# Patient Record
Sex: FEMALE | Race: BLACK OR AFRICAN AMERICAN | Employment: FULL TIME | URBAN - METROPOLITAN AREA
[De-identification: names, ages, dates, MRNs, and addresses within clinical notes are randomized per-mention and may not be internally consistent; named-entity substitution may affect disease eponyms.]

---

## 2022-08-26 ENCOUNTER — APPOINTMENT (OUTPATIENT)
Dept: CT IMAGING | Age: 57
DRG: 045 | End: 2022-08-26
Attending: STUDENT IN AN ORGANIZED HEALTH CARE EDUCATION/TRAINING PROGRAM
Payer: MEDICAID

## 2022-08-26 ENCOUNTER — APPOINTMENT (OUTPATIENT)
Dept: MRI IMAGING | Age: 57
DRG: 045 | End: 2022-08-26
Attending: INTERNAL MEDICINE
Payer: MEDICAID

## 2022-08-26 ENCOUNTER — HOSPITAL ENCOUNTER (INPATIENT)
Age: 57
LOS: 13 days | Discharge: NURSING, REHAB OR LONG-TERM CARE | DRG: 045 | End: 2022-09-08
Attending: STUDENT IN AN ORGANIZED HEALTH CARE EDUCATION/TRAINING PROGRAM | Admitting: INTERNAL MEDICINE
Payer: MEDICAID

## 2022-08-26 DIAGNOSIS — I63.9 CEREBROVASCULAR ACCIDENT (CVA), UNSPECIFIED MECHANISM (HCC): Primary | ICD-10-CM

## 2022-08-26 LAB
ALBUMIN SERPL-MCNC: 4.2 G/DL (ref 3.5–5)
ALBUMIN/GLOB SERPL: 1.1 {RATIO} (ref 1.1–2.2)
ALP SERPL-CCNC: 68 U/L (ref 45–117)
ALT SERPL-CCNC: 20 U/L (ref 12–78)
ANION GAP SERPL CALC-SCNC: 8 MMOL/L (ref 5–15)
APTT PPP: 27.1 SEC (ref 21.2–34.1)
AST SERPL W P-5'-P-CCNC: 20 U/L (ref 15–37)
ATRIAL RATE: 65 BPM
BASOPHILS # BLD: 0 K/UL (ref 0–0.1)
BASOPHILS NFR BLD: 1 % (ref 0–1)
BILIRUB SERPL-MCNC: 0.6 MG/DL (ref 0.2–1)
BUN SERPL-MCNC: 5 MG/DL (ref 6–20)
BUN/CREAT SERPL: 7 (ref 12–20)
CA-I BLD-MCNC: 10 MG/DL (ref 8.5–10.1)
CALCULATED P AXIS, ECG09: 45 DEGREES
CALCULATED R AXIS, ECG10: -28 DEGREES
CALCULATED T AXIS, ECG11: 3 DEGREES
CHLORIDE SERPL-SCNC: 107 MMOL/L (ref 97–108)
CO2 SERPL-SCNC: 25 MMOL/L (ref 21–32)
CREAT SERPL-MCNC: 0.7 MG/DL (ref 0.55–1.02)
DIAGNOSIS, 93000: NORMAL
DIFFERENTIAL METHOD BLD: ABNORMAL
EOSINOPHIL # BLD: 0.2 K/UL (ref 0–0.4)
EOSINOPHIL NFR BLD: 2 % (ref 0–7)
ERYTHROCYTE [DISTWIDTH] IN BLOOD BY AUTOMATED COUNT: 15.3 % (ref 11.5–14.5)
GLOBULIN SER CALC-MCNC: 3.7 G/DL (ref 2–4)
GLUCOSE BLD STRIP.AUTO-MCNC: 136 MG/DL (ref 65–117)
GLUCOSE SERPL-MCNC: 124 MG/DL (ref 65–100)
HCT VFR BLD AUTO: 38.2 % (ref 35–47)
HGB BLD-MCNC: 12.2 G/DL (ref 11.5–16)
IMM GRANULOCYTES # BLD AUTO: 0 K/UL (ref 0–0.04)
IMM GRANULOCYTES NFR BLD AUTO: 0 % (ref 0–0.5)
INR PPP: 1 (ref 0.9–1.1)
LYMPHOCYTES # BLD: 2.7 K/UL (ref 0.8–3.5)
LYMPHOCYTES NFR BLD: 39 % (ref 12–49)
MCH RBC QN AUTO: 28.2 PG (ref 26–34)
MCHC RBC AUTO-ENTMCNC: 31.9 G/DL (ref 30–36.5)
MCV RBC AUTO: 88.2 FL (ref 80–99)
MONOCYTES # BLD: 0.5 K/UL (ref 0–1)
MONOCYTES NFR BLD: 7 % (ref 5–13)
NEUTS SEG # BLD: 3.4 K/UL (ref 1.8–8)
NEUTS SEG NFR BLD: 51 % (ref 32–75)
NRBC # BLD: 0 K/UL (ref 0–0.01)
NRBC BLD-RTO: 0 PER 100 WBC
P-R INTERVAL, ECG05: 184 MS
PERFORMED BY, TECHID: ABNORMAL
PLATELET # BLD AUTO: 312 K/UL (ref 150–400)
PMV BLD AUTO: 8.8 FL (ref 8.9–12.9)
POTASSIUM SERPL-SCNC: 3.6 MMOL/L (ref 3.5–5.1)
PROT SERPL-MCNC: 7.9 G/DL (ref 6.4–8.2)
PROTHROMBIN TIME: 13.1 SEC (ref 11.9–14.6)
Q-T INTERVAL, ECG07: 450 MS
QRS DURATION, ECG06: 92 MS
QTC CALCULATION (BEZET), ECG08: 468 MS
RBC # BLD AUTO: 4.33 M/UL (ref 3.8–5.2)
SODIUM SERPL-SCNC: 140 MMOL/L (ref 136–145)
THERAPEUTIC RANGE,PTTT: NORMAL SEC (ref 82–109)
TROPONIN-HIGH SENSITIVITY: 10 NG/L (ref 0–51)
VENTRICULAR RATE, ECG03: 65 BPM
WBC # BLD AUTO: 6.8 K/UL (ref 3.6–11)

## 2022-08-26 PROCEDURE — 84484 ASSAY OF TROPONIN QUANT: CPT

## 2022-08-26 PROCEDURE — 85730 THROMBOPLASTIN TIME PARTIAL: CPT

## 2022-08-26 PROCEDURE — 80053 COMPREHEN METABOLIC PANEL: CPT

## 2022-08-26 PROCEDURE — 85025 COMPLETE CBC W/AUTO DIFF WBC: CPT

## 2022-08-26 PROCEDURE — 74011250637 HC RX REV CODE- 250/637: Performed by: INTERNAL MEDICINE

## 2022-08-26 PROCEDURE — 82962 GLUCOSE BLOOD TEST: CPT

## 2022-08-26 PROCEDURE — 70450 CT HEAD/BRAIN W/O DYE: CPT

## 2022-08-26 PROCEDURE — 70551 MRI BRAIN STEM W/O DYE: CPT

## 2022-08-26 PROCEDURE — 99285 EMERGENCY DEPT VISIT HI MDM: CPT

## 2022-08-26 PROCEDURE — 74011250636 HC RX REV CODE- 250/636: Performed by: INTERNAL MEDICINE

## 2022-08-26 PROCEDURE — 65270000029 HC RM PRIVATE

## 2022-08-26 PROCEDURE — 70496 CT ANGIOGRAPHY HEAD: CPT

## 2022-08-26 PROCEDURE — 85610 PROTHROMBIN TIME: CPT

## 2022-08-26 PROCEDURE — 93005 ELECTROCARDIOGRAM TRACING: CPT

## 2022-08-26 PROCEDURE — 36415 COLL VENOUS BLD VENIPUNCTURE: CPT

## 2022-08-26 PROCEDURE — 74011000636 HC RX REV CODE- 636: Performed by: STUDENT IN AN ORGANIZED HEALTH CARE EDUCATION/TRAINING PROGRAM

## 2022-08-26 PROCEDURE — 92610 EVALUATE SWALLOWING FUNCTION: CPT

## 2022-08-26 RX ORDER — AMLODIPINE BESYLATE 5 MG/1
10 TABLET ORAL DAILY
Status: DISCONTINUED | OUTPATIENT
Start: 2022-08-26 | End: 2022-09-08 | Stop reason: HOSPADM

## 2022-08-26 RX ORDER — IBUPROFEN 200 MG
1 TABLET ORAL DAILY
Status: DISCONTINUED | OUTPATIENT
Start: 2022-08-26 | End: 2022-09-08 | Stop reason: HOSPADM

## 2022-08-26 RX ORDER — ENOXAPARIN SODIUM 100 MG/ML
40 INJECTION SUBCUTANEOUS EVERY 24 HOURS
Status: DISCONTINUED | OUTPATIENT
Start: 2022-08-26 | End: 2022-09-08 | Stop reason: HOSPADM

## 2022-08-26 RX ORDER — HYDRALAZINE HYDROCHLORIDE 20 MG/ML
10 INJECTION INTRAMUSCULAR; INTRAVENOUS
Status: DISCONTINUED | OUTPATIENT
Start: 2022-08-26 | End: 2022-09-08 | Stop reason: HOSPADM

## 2022-08-26 RX ORDER — GUAIFENESIN 100 MG/5ML
81 LIQUID (ML) ORAL DAILY
Status: DISCONTINUED | OUTPATIENT
Start: 2022-08-26 | End: 2022-09-08 | Stop reason: HOSPADM

## 2022-08-26 RX ORDER — LISINOPRIL 10 MG/1
10 TABLET ORAL DAILY
Status: DISCONTINUED | OUTPATIENT
Start: 2022-08-26 | End: 2022-08-27

## 2022-08-26 RX ORDER — ACETAMINOPHEN 650 MG/1
650 SUPPOSITORY RECTAL
Status: DISCONTINUED | OUTPATIENT
Start: 2022-08-26 | End: 2022-09-08 | Stop reason: HOSPADM

## 2022-08-26 RX ORDER — ACETAMINOPHEN 325 MG/1
650 TABLET ORAL
Status: DISCONTINUED | OUTPATIENT
Start: 2022-08-26 | End: 2022-09-08 | Stop reason: HOSPADM

## 2022-08-26 RX ADMIN — IOPAMIDOL 100 ML: 755 INJECTION, SOLUTION INTRAVENOUS at 07:50

## 2022-08-26 RX ADMIN — ENOXAPARIN SODIUM 40 MG: 100 INJECTION SUBCUTANEOUS at 10:03

## 2022-08-26 RX ADMIN — LISINOPRIL 10 MG: 10 TABLET ORAL at 10:02

## 2022-08-26 RX ADMIN — HYDRALAZINE HYDROCHLORIDE 10 MG: 20 INJECTION, SOLUTION INTRAMUSCULAR; INTRAVENOUS at 10:03

## 2022-08-26 RX ADMIN — AMLODIPINE BESYLATE 10 MG: 5 TABLET ORAL at 10:02

## 2022-08-26 RX ADMIN — ASPIRIN 81 MG CHEWABLE TABLET 81 MG: 81 TABLET CHEWABLE at 10:02

## 2022-08-26 NOTE — PROGRESS NOTES
Problem: Falls - Risk of  Goal: *Absence of Falls  Description: Document [place] Flow Fall Risk and appropriate interventions in the flowsheet.   Outcome: Not Met  Note: Fall Risk Interventions:  Mobility Interventions: Bed/chair exit alarm, PT Consult for mobility concerns, Patient to call before getting OOB         Medication Interventions: Bed/chair exit alarm, Patient to call before getting OOB    Elimination Interventions: Bed/chair exit alarm, Call light in reach              Problem: Patient Education: Go to Patient Education Activity  Goal: Patient/Family Education  Outcome: Not Met

## 2022-08-26 NOTE — ED TRIAGE NOTES
Right sided weakness since 1300 yesterday afternoon along with a right sided facial droop patient states she felt like she couldn't walk right  Level 2 stroke alert called in triage

## 2022-08-26 NOTE — ED PROVIDER NOTES
[facility]  EMERGENCY DEPARTMENT ENCOUNTER NOTE    Date: 8/26/2022  Patient Name: Alyssa Carbone    History of Presenting Illness     Chief Complaint   Patient presents with    Extremity Weakness     HPI: Alyssa Carbone, 59 y.o. female with a past medical history and outpatient medications as listed and reviewed below  presents for weakness in the right upper and lower extremities as well as right-sided facial droop. He started having symptoms yesterday in the afternoon. She denies any headache or numbness however she does report weakness in the above-mentioned areas. No trauma. No history of similar. No AC use. she reports that she does not have any history of medical problems and has seen his doctor last 2 years ago. She does have elevated blood pressures after triage up to 897 systolic. Medical History   I reviewed the medical, surgical, family, and social history, as well as allergies:    PCP: None    Past Medical History:  History reviewed. No pertinent past medical history. Past Surgical History:  History reviewed. No pertinent surgical history. Current Outpatient Medications:  No current outpatient medications   Family History:  History reviewed. No pertinent family history. Social History:  Social History     Tobacco Use    Smoking status: Never    Smokeless tobacco: Never   Substance Use Topics    Alcohol use: Not Currently    Drug use: Never     Allergies:  No Known Allergies    Review of Systems     Review of Systems  Negative: Positives and pertinent negatives as per HPI. All other systems were reviewed and are negative. Physical Exam and Vital Signs   Vital Signs - Reviewed the patient's vital signs.     Patient Vitals for the past 12 hrs:   Temp Pulse Resp BP SpO2   08/26/22 0726 -- 84 23 (!) 194/103 99 %   08/26/22 0715 -- 67 18 (!) 197/87 98 %   08/26/22 0646 98.4 °F (36.9 °C) 84 16 139/84 99 %     Physical Exam:    GENERAL: awake, alert, cooperative, not in distress  HEENT:  * Pupils equal, EOMI  * Head atraumatic  CV:  * audible heart sounds  * warm and perfused extremities bilaterally  PULMONARY: Good air movement, no wheezes, no crackles  ABDOMEN/: soft, no distension, no guarding, no abdominal tenderness  EXTREMITIES/BACK: warm and perfused, no tenderness, no edema  SKIN: no rashes or signs of trauma  NEURO:   * GCS = 15 (E=4, V=5, M=6)  * Awake, alert, oriented x 3, normal speech  * R lower facial droop  * Strength 5/5 in LUE and LLE, 4/5 in RUE and RLE with pronator drift  * Sensory exam grossly normal  * No pronator drift or dysmetria  * NIHSS 5      Medical Decision Making   - I am the first and primary provider for this patient and am the primary provider of record. - I reviewed the vital signs, available nursing notes, past medical history, past surgical history, family history and social history. - Initial assessment performed. The patients presenting problems have been discussed, and the staff are in agreement with the care plan formulated and outlined with them. I have encouraged them to ask questions as they arise throughout their visit. - Available medical records, nursing notes, old EKGs, and EMS run sheets (if patient was EMS transported) were reviewed    MDM:   Patient is a 59 y.o. female presenting for stroke like symptoms with last known normal yesterday 2pm. Vitals reveal  HTN  and physical exam reveals NIHSS 5 due to R face,RUE,RLE weakness and mild dysarthria. . EKG showed no significant abnormalities. Based on the history, physical exam, risk factors, and vitals signs, I favor the following differential diagnoses: ischemic stroke, hemorrhagic stroke, ICH, seizure, TIA, vertebrobasilar insufficiency, carotid stenosis, metabolic encephalopathy. Patient is outside the window for TPA administration. Will initiate workup and treatment. See ED Course and Reassessment for evaluation and discussion.     Results Labs:  Recent Results (from the past 12 hour(s))   GLUCOSE, POC    Collection Time: 08/26/22  6:46 AM   Result Value Ref Range    Glucose (POC) 136 (H) 65 - 117 mg/dL    Performed by Isai Bonilla    CBC WITH AUTOMATED DIFF    Collection Time: 08/26/22  7:02 AM   Result Value Ref Range    WBC 6.8 3.6 - 11.0 K/uL    RBC 4.33 3.80 - 5.20 M/uL    HGB 12.2 11.5 - 16.0 g/dL    HCT 38.2 35.0 - 47.0 %    MCV 88.2 80.0 - 99.0 FL    MCH 28.2 26.0 - 34.0 PG    MCHC 31.9 30.0 - 36.5 g/dL    RDW 15.3 (H) 11.5 - 14.5 %    PLATELET 980 298 - 142 K/uL    MPV 8.8 (L) 8.9 - 12.9 FL    NRBC 0.0 0.0  WBC    ABSOLUTE NRBC 0.00 0.00 - 0.01 K/uL    NEUTROPHILS 51 32 - 75 %    LYMPHOCYTES 39 12 - 49 %    MONOCYTES 7 5 - 13 %    EOSINOPHILS 2 0 - 7 %    BASOPHILS 1 0 - 1 %    IMMATURE GRANULOCYTES 0 0 - 0.5 %    ABS. NEUTROPHILS 3.4 1.8 - 8.0 K/UL    ABS. LYMPHOCYTES 2.7 0.8 - 3.5 K/UL    ABS. MONOCYTES 0.5 0.0 - 1.0 K/UL    ABS. EOSINOPHILS 0.2 0.0 - 0.4 K/UL    ABS. BASOPHILS 0.0 0.0 - 0.1 K/UL    ABS. IMM. GRANS. 0.0 0.00 - 0.04 K/UL    DF AUTOMATED     METABOLIC PANEL, COMPREHENSIVE    Collection Time: 08/26/22  7:02 AM   Result Value Ref Range    Sodium 140 136 - 145 mmol/L    Potassium 3.6 3.5 - 5.1 mmol/L    Chloride 107 97 - 108 mmol/L    CO2 25 21 - 32 mmol/L    Anion gap 8 5 - 15 mmol/L    Glucose 124 (H) 65 - 100 mg/dL    BUN 5 (L) 6 - 20 mg/dL    Creatinine 0.70 0.55 - 1.02 mg/dL    BUN/Creatinine ratio 7 (L) 12 - 20      GFR est AA >60 >60 ml/min/1.73m2    GFR est non-AA >60 >60 ml/min/1.73m2    Calcium 10.0 8.5 - 10.1 mg/dL    Bilirubin, total 0.6 0.2 - 1.0 mg/dL    AST (SGOT) 20 15 - 37 U/L    ALT (SGPT) 20 12 - 78 U/L    Alk.  phosphatase 68 45 - 117 U/L    Protein, total 7.9 6.4 - 8.2 g/dL    Albumin 4.2 3.5 - 5.0 g/dL    Globulin 3.7 2.0 - 4.0 g/dL    A-G Ratio 1.1 1.1 - 2.2     PROTHROMBIN TIME + INR    Collection Time: 08/26/22  7:02 AM   Result Value Ref Range    Prothrombin time 13.1 11.9 - 14.6 sec INR 1.0 0.9 - 1.1     PTT    Collection Time: 08/26/22  7:02 AM   Result Value Ref Range    aPTT 27.1 21.2 - 34.1 sec    aPTT, therapeutic range   82 - 109 sec   TROPONIN-HIGH SENSITIVITY    Collection Time: 08/26/22  7:02 AM   Result Value Ref Range    Troponin-High Sensitivity 10 0 - 51 ng/L     Radiologic Studies:  CT Results  (Last 48 hours)                 08/26/22 0655  CT CODE NEURO HEAD WO CONTRAST Final result    Impression:  No acute intracranial process. Narrative:  INDICATION: Weakness and facial droop       TECHNIQUE: 5 mm axial images were obtained from the skull base through the   vertex. CT dose reduction was achieved through use of a standardized protocol   tailored for this examination and automatic exposure control for dose   modulation. FINDINGS: The ventricles and cortical sulci are appropriate in size and   configuration. There is no evidence of intracranial hemorrhage, mass, mass   effect, or acute infarct. No extra-axial fluid collections are seen. The   visualized paranasal sinuses and mastoid air cells are clear. The orbital   structures are unremarkable. No osseous abnormalities are seen. CXR Results  (Last 48 hours)      None          Medications ordered:  Medications   iopamidoL (ISOVUE-370) 76 % injection 100 mL (100 mL IntraVENous Given 8/26/22 0750)     ED Course and Reassessment     ED Course:     ED Course as of 09/02/22 1120   Fri Aug 26, 2022   0812 CBC does not show any evidence of acute process. Leukocytosis not present to suggest infection. Hemoglobin not suggestive of acute anemia. No significant electrolyte derangements. Creatinine is not elevated more than baseline range making DIVINA unlikely. No significant transaminitis noted. Normal bilirubin. Trop 10 will trend. [SS]   0813 Head CT was negative for acute process making acute intracranial bleed unlikely. No evidence of large subacute stroke, ventriculomegaly, or masses.    [SS] 7170 Will admit for workup of likely basal ganglia stroke. [SS]      ED Course User Index  [SS] Martha Luciano MD       Reassessment:    Will admit. Final Disposition     Admission: [facility]    After completion of ED workup and discussion of results and diagnoses, patient was admitted to the hospital. Case was discussed with admitting provider. ED Procedures   Performed by: Cyndy Ordonez MD  Procedures     EKG interpretation (Preliminary):  Rhythm: normal sinus rhythm; and regular . Rate (approx.): 65.  Axis: normal;  IL interval: normal;  QRS interval: normal ;  ST/T wave: abnormal: Nonspecific T-Wave inversion present in: III  Other findings: abnormal EKG: Nonspecific T wave inversion    CRITICAL CARE NOTE :  11:20 AM    Critical care time is being documented due to the fulfillment of at least one of the following:    - Critical conditions: condition that acutely impairs one or more vital organ systems such that there is a high probability of imminent or life-threatening deterioration in condition. Examples are diagnoses including but not limited to Afib RVR, DKA, PE, Etc. .    - Critical interventions: an action whose failure to initiate would likely allow a sudden, clinically significant decline in the patient's condition. These include  Requirement of transfer or ICU admission  Contemplation or provision of tPA  Drip initiation (pressors, antiarrhythmics, heparin, etc.)  Antidotes given (narcan, charcoal, epi for anaphylaxis, etc..)  >=2L fluid bolus  >=3 Duonebs  >1 IV/IM doses of sedatives, antiepileptics, BP meds, rate control meds, adenosine. Procedures that are suggestive of critical care: chest tubes, cardioversion, BiPAP, IO, etc..    Critical care time is documented based on continuous or non-continuous provision of care that includes face-to-face time, placing orders, chart review, documentation, discussion with consultants, discussion with family.  This time calculation is a best approximation and does not include time spent on CPR, EKG interpretation, central line placement, intubation, laceration repairs, and other separately billed procedures. Amount of Critical Care Time: 35minutes    Details of critical care provision is documented above. A general summary is listed below:    IMPENDING DETERIORATION - CNS  ASSOCIATED RISK FACTORS - CNS Decompensation  MANAGEMENT- Bedside Assessment  INTERPRETATION -  CT Scan, ECG, and Blood Pressure  INTERVENTIONS - Neurologic/Metabolic interventions  CASE REVIEW - Hospitalist  TREATMENT RESPONSE - Stable  PERFORMED BY - Self    NOTES   :  During this entire length of time I was immediately available to the patient . Nathaly Tiwari MD     Diagnosis     Clinical Impression:   1. Cerebrovascular accident (CVA), unspecified mechanism ([facility] [address]      Attestations:    Nathaly Tiwari MD    Please note that this dictation was completed with [employer], the computer voice recognition software. Quite often unanticipated grammatical, syntax, homophones, and other interpretive errors are inadvertently transcribed by the computer software. Please disregard these errors. Please excuse any errors that have escaped final proofreading. Thank you.

## 2022-08-26 NOTE — PROGRESS NOTES
Reason for Admission:  CVA                     RUR Score:      N/A               Plan for utilizing home health:  Not at this time        PCP: First and Last name:  None     Name of Practice:    Are you a current patient: Yes/No:    Approximate date of last visit:    Can you participate in a virtual visit with your PCP:                     Current Advanced Directive/Advance Care Plan: No Order      Healthcare Decision Maker:   Click here to complete [address] including selection of the Healthcare Decision Maker Relationship (ie \"Primary\")    Kae Maldonado, mother, [phone]                         Transition of Care Plan:          Met f/f with Pt, she confirmed that the information on the face sheet is correct. Pt stated that she lives with her daughter. Pt stated that no HH, no DME and independent with ADL. Pt stated that she uses [facility] in [place]. Pt stated that family will give her a ride when she is D/C. CM dispo: TBD, CM will follow for D/C needs.

## 2022-08-26 NOTE — PROGRESS NOTES
SPEECH LANGUAGE PATHOLOGY BEDSIDE SWALLOW EVALUATIONS  Patient: Ignacio Arzola  (69 y.o. )  Date: 8/26/2022  Primary Diagnosis: CVA   Precautions:  Fall    ASSESSMENT :  Patient is A&Ox4 and follows basic commands. R facial droop w/ R lingual deviation and mild dysarthria. Speech is 95% intelligible. Informally, language appears wfl. Oropharyngeal swallow is wfl. Oral phase c/b timely mastication and effective bolus formatio and manipulation. Pharyngeal phase c/b timely swallow and HLE is wfl upon palpation. NO overt s/s of pen/asp observed. PLAN :  Recommendations and Planned Interventions:  Rec cont regular/thin diet w/ strict asp/GERD precautions. No follow up for sw fxn. Rec speech evaluation. SUBJECTIVE:   Patient reports denies dysphagia reports speech is intermittently slurred. OBJECTIVE:   Patient admitted w/ R sided weakness and R facial droop. PMH for HTN an tobacco use. Diet prior to admission: Regular  Current Diet:  DIET ADULT     Cognitive and Communication Status:  Neurologic State: Alert  Orientation Level: Oriented X4  Cognition: Follows commands, Appropriate decision making  Perception: Appears intact  Perseveration: No perseveration noted  Safety/Judgement: Awareness of environment  Swallowing Evaluation:   Oral Assessment:  Oral Assessment  Labial: Right droop  Dentition: Intact  Oral Hygiene: wfl  Lingual: Right deviation  Velum: No impairment  Mandible: No impairment  P.O. Trials:  Patient Position: upright in stretcher  Vocal quality prior to P.O.: No impairment  Consistency Presented: Thin liquid; Solid;Puree  How Presented: Self-fed/presented;Straw;Successive swallows     Bolus Acceptance: No impairment  Bolus Formation/Control: No impairment     Propulsion: No impairment  Oral Residue: None  Initiation of Swallow: No impairment  Laryngeal Elevation: Functional  Aspiration Signs/Symptoms: None  Pharyngeal Phase Characteristics: No impairment, issues, or problems Oral Phase Severity: No impairment  Pharyngeal Phase Severity : No impairment  Voice:  Vocal Quality: No impairment          Pain:   0    After treatment:   Patient left in no apparent distress in bed, Call bell within reach, Nursing notified, and Caregiver / family present    COMMUNICATION/EDUCATION:   Patient was educated regarding BEFAST, s/s aspiration, aspiration precautions, diet recs, speech strategies and ST POC. She demonstrated Good understanding as evidenced by engagement. The patient's plan of care including recommendations, planned interventions, and recommended diet changes were discussed with: Registered nurse.          Thank you for this referral.  Bernarda Zarate M.S., M.Ed., CCC-SLP  Time Calculation: 10 mins

## 2022-08-26 NOTE — ROUTINE PROCESS
TRANSFER - OUT REPORT:    Verbal report given to lisandro on Bonita Marin  being transferred to  for routine progression of care       Report consisted of patients Situation, Background, Assessment and   Recommendations(SBAR). Information from the following report(s) SBAR was reviewed with the receiving nurse. Lines:   Peripheral IV 08/26/22 Right Antecubital (Active)   Site Assessment Clean, dry, & intact 08/26/22 0704   Phlebitis Assessment 0 08/26/22 0704   Infiltration Assessment 0 08/26/22 0704   Dressing Status Clean, dry, & intact 08/26/22 0704   Dressing Type Transparent 08/26/22 0704   Hub Color/Line Status Pink;Flushed 08/26/22 0704   Action Taken Blood drawn 08/26/22 4836        Opportunity for questions and clarification was provided.       Patient transported with:   [employer]

## 2022-08-26 NOTE — H&P
History & Physical    Primary Care Provider: None  Source of Information: Patient self    History of Presenting Illness:   Claudio Ovalle is a 59 y.o. female who presents with reports of right-sided extremity weakness with onset about 2:35 PM yesterday. She was at [facility] and developed right-sided weakness with inability to  stuff in the right hand. Went home and slept it off. Woke up early this morning and had significant difficulties getting out of bed therefore decided to come to the hospital.  Denies headaches, visual problems or trouble swallowing. No prior history of hypertension or diabetes. Evaluation in the ED revealed a systolic blood pressure of greater than 535 with a diastolic of over 611. She was completely out of tPA window. CT brain as well as CTA head and neck unremarkable. She does have a right-sided facial droop. Will be admitted for further neurologic work-up       Review of Systems:  A comprehensive review of systems was negative except for that written in the History of Present Illness. History reviewed. No pertinent past medical history. History reviewed. No pertinent surgical history. Prior to Admission medications    Not on File     No Known Allergies   History reviewed. No pertinent family history. SOCIAL HISTORY:  Patient resides:  Independently    Assisted Living    SNF    With family care x      Smoking history:   None    Former    Chronic x     Alcohol history:   None    Social x   Chronic      Ambulates:   Independently x   w/cane    w/walker    w/wc    CODE STATUS:  DNR    Full x   Other      Objective:     Physical Exam:     Visit Vitals  BP (!) 189/119 (BP 1 Location: Left arm, BP Patient Position: At rest)   Pulse 75   Temp 98.4 °F (36.9 °C)   Resp 23   Ht 5' 6\" (1.676 m)   Wt 73.5 kg (162 lb)   SpO2 100%   BMI 26.15 kg/m²      O2 Device: None (Room air)    General:  Alert, cooperative, no distress, appears stated age. Head:  Normocephalic, without obvious abnormality, atraumatic. Eyes:  Conjunctivae/corneas clear. PERRL, EOMs intact. Nose: Nares normal. Septum midline. Mucosa normal. No drainage or sinus tenderness. Throat: Lips, mucosa, and tongue normal. Teeth and gums normal.   Neck: Supple, symmetrical, trachea midline, no adenopathy, thyroid: no enlargement/tenderness/nodules, no carotid bruit and no JVD. Back:   Symmetric, no curvature. ROM normal. No CVA tenderness. Lungs:   Clear to auscultation bilaterally. Chest wall:  No tenderness or deformity. Heart:  Regular rate and rhythm, S1, S2 normal, no murmur, click, rub or gallop. Abdomen:   Soft, non-tender. Bowel sounds normal. No masses,  No organomegaly. Extremities: Extremities normal, atraumatic, no cyanosis or edema. Pulses: 2+ and symmetric all extremities. Skin: Skin color, texture, turgor normal. No rashes or lesions   Neurologic: CNII-XII intact. No motor or sensory deficits. EKG:  nonspecific ST and T waves changes. Data Review:     Recent Days:  Recent Labs     08/26/22  0702   WBC 6.8   HGB 12.2   HCT 38.2        Recent Labs     08/26/22  0702      K 3.6      CO2 25   *   BUN 5*   CREA 0.70   CA 10.0   ALB 4.2   TBILI 0.6   ALT 20   INR 1.0     No results for input(s): PH, PCO2, PO2, HCO3, FIO2 in the last 72 hours.     24 Hour Results:  Recent Results (from the past 24 hour(s))   GLUCOSE, POC    Collection Time: 08/26/22  6:46 AM   Result Value Ref Range    Glucose (POC) 136 (H) 65 - 117 mg/dL    Performed by Curtis Bryant    CBC WITH AUTOMATED DIFF    Collection Time: 08/26/22  7:02 AM   Result Value Ref Range    WBC 6.8 3.6 - 11.0 K/uL    RBC 4.33 3.80 - 5.20 M/uL    HGB 12.2 11.5 - 16.0 g/dL    HCT 38.2 35.0 - 47.0 %    MCV 88.2 80.0 - 99.0 FL    MCH 28.2 26.0 - 34.0 PG    MCHC 31.9 30.0 - 36.5 g/dL    RDW 15.3 (H) 11.5 - 14.5 %    PLATELET 842 451 - 992 K/uL    MPV 8.8 (L) 8.9 - 12.9 FL NRBC 0.0 0.0  WBC    ABSOLUTE NRBC 0.00 0.00 - 0.01 K/uL    NEUTROPHILS 51 32 - 75 %    LYMPHOCYTES 39 12 - 49 %    MONOCYTES 7 5 - 13 %    EOSINOPHILS 2 0 - 7 %    BASOPHILS 1 0 - 1 %    IMMATURE GRANULOCYTES 0 0 - 0.5 %    ABS. NEUTROPHILS 3.4 1.8 - 8.0 K/UL    ABS. LYMPHOCYTES 2.7 0.8 - 3.5 K/UL    ABS. MONOCYTES 0.5 0.0 - 1.0 K/UL    ABS. EOSINOPHILS 0.2 0.0 - 0.4 K/UL    ABS. BASOPHILS 0.0 0.0 - 0.1 K/UL    ABS. IMM. GRANS. 0.0 0.00 - 0.04 K/UL    DF AUTOMATED     METABOLIC PANEL, COMPREHENSIVE    Collection Time: 08/26/22  7:02 AM   Result Value Ref Range    Sodium 140 136 - 145 mmol/L    Potassium 3.6 3.5 - 5.1 mmol/L    Chloride 107 97 - 108 mmol/L    CO2 25 21 - 32 mmol/L    Anion gap 8 5 - 15 mmol/L    Glucose 124 (H) 65 - 100 mg/dL    BUN 5 (L) 6 - 20 mg/dL    Creatinine 0.70 0.55 - 1.02 mg/dL    BUN/Creatinine ratio 7 (L) 12 - 20      GFR est AA >60 >60 ml/min/1.73m2    GFR est non-AA >60 >60 ml/min/1.73m2    Calcium 10.0 8.5 - 10.1 mg/dL    Bilirubin, total 0.6 0.2 - 1.0 mg/dL    AST (SGOT) 20 15 - 37 U/L    ALT (SGPT) 20 12 - 78 U/L    Alk. phosphatase 68 45 - 117 U/L    Protein, total 7.9 6.4 - 8.2 g/dL    Albumin 4.2 3.5 - 5.0 g/dL    Globulin 3.7 2.0 - 4.0 g/dL    A-G Ratio 1.1 1.1 - 2.2     PROTHROMBIN TIME + INR    Collection Time: 08/26/22  7:02 AM   Result Value Ref Range    Prothrombin time 13.1 11.9 - 14.6 sec    INR 1.0 0.9 - 1.1     PTT    Collection Time: 08/26/22  7:02 AM   Result Value Ref Range    aPTT 27.1 21.2 - 34.1 sec    aPTT, therapeutic range   82 - 109 sec   TROPONIN-HIGH SENSITIVITY    Collection Time: 08/26/22  7:02 AM   Result Value Ref Range    Troponin-High Sensitivity 10 0 - 51 ng/L         Imaging:   CTA CODE NEURO HEAD AND NECK W CONT   Final Result      1. No acute process. No large vessel occlusion, arterial dissection, or   flow-limiting stenosis. 2. CT perfusion not performed. CT CODE NEURO HEAD WO CONTRAST   Final Result   No acute intracranial process. MRI BRAIN WO CONT    (Results Pending)         Assessment:     Acute CVA    -Likely involving the left MCA    -Obtain stat MRI brain without contrast    -Begin aspirin 325 mg oral now then 81 mg oral daily    -Lipitor 80 mg oral daily    -Obtain fasting lipid profile and A1c level    -Neuro check every 4 hours x24 hours    -Consult neurologist    Hypertensive emergency    -We will begin amlodipine 10 mg oral daily and lisinopril 10 mg daily    -Adjust blood pressure medications to maintain systolic blood pressure below 160. Its been over 18 hours of symptoms onset.      Chronic tobacco use    -Begin nicotine patch 14 mg daily    -Counseled against tobacco use    Plan:     Admit to medical telemetry floor inpatient  Treatment plan as outlined above  Consult neurologist  DVT GI prophylaxis  Goals of care discussed with patient and daughter  She is full code      Signed By: Linda Anderson MD     August 26, 2022

## 2022-08-26 NOTE — PROGRESS NOTES
Problem: Falls - Risk of  Goal: *Absence of Falls  Description: Document Niya Jimenez Fall Risk and appropriate interventions in the flowsheet.   8/26/2022 1759 by Sarah Tate RN  Outcome: Not Met  Note: Fall Risk Interventions:  Mobility Interventions: Bed/chair exit alarm, PT Consult for mobility concerns, Patient to call before getting OOB         Medication Interventions: Bed/chair exit alarm, Patient to call before getting OOB    Elimination Interventions: Bed/chair exit alarm, Call light in reach           8/26/2022 1758 by Sarah Tate RN  Outcome: Not Met  Note: Fall Risk Interventions:  Mobility Interventions: Bed/chair exit alarm, PT Consult for mobility concerns, Patient to call before getting OOB         Medication Interventions: Bed/chair exit alarm, Patient to call before getting OOB    Elimination Interventions: Bed/chair exit alarm, Call light in reach              Problem: Patient Education: Go to Patient Education Activity  Goal: Patient/Family Education  8/26/2022 1759 by Sarah Tate RN  Outcome: Not Met  8/26/2022 1758 by Sarah Tate RN  Outcome: Not Met     Problem: Patient Education: Go to Patient Education Activity  Goal: Patient/Family Education  Outcome: Not Met     Problem: TIA/CVA Stroke: 0-24 hours  Goal: Off Pathway (Use only if patient is Off Pathway)  Outcome: Not Met  Goal: Activity/Safety  Outcome: Not Met  Goal: Consults, if ordered  Outcome: Not Met  Goal: Diagnostic Test/Procedures  Outcome: Not Met  Goal: Nutrition/Diet  Outcome: Not Met  Goal: Discharge Planning  Outcome: Not Met  Goal: Medications  Outcome: Not Met  Goal: Respiratory  Outcome: Not Met  Goal: Treatments/Interventions/Procedures  Outcome: Not Met  Goal: Minimize risk of bleeding post-thrombolytic infusion  Outcome: Not Met  Goal: Monitor for complications post-thrombolytic infusion  Outcome: Not Met  Goal: Psychosocial  Outcome: Not Met  Goal: *Hemodynamically stable  Outcome: Not Met  Goal: *Neurologically stable  Description: Absence of additional neurological deficits    Outcome: Not Met  Goal: *Verbalizes anxiety and depression are reduced or absent  Outcome: Not Met  Goal: *Absence of Signs of Aspiration on Current Diet  Outcome: Not Met  Goal: *Absence of deep venous thrombosis signs and symptoms(Stroke Metric)  Outcome: Not Met  Goal: *Ability to perform ADLs and demonstrates progressive mobility and function  Outcome: Not Met  Goal: *Stroke education started(Stroke Metric)  Outcome: Not Met  Goal: *Dysphagia screen performed(Stroke Metric)  Outcome: Not Met  Goal: *Rehab consulted(Stroke Metric)  Outcome: Not Met     Problem: TIA/CVA Stroke: Day 2 Until Discharge  Goal: Off Pathway (Use only if patient is Off Pathway)  Outcome: Not Met  Goal: Activity/Safety  Outcome: Not Met  Goal: Diagnostic Test/Procedures  Outcome: Not Met  Goal: Nutrition/Diet  Outcome: Not Met  Goal: Discharge Planning  Outcome: Not Met  Goal: Medications  Outcome: Not Met  Goal: Respiratory  Outcome: Not Met  Goal: Treatments/Interventions/Procedures  Outcome: Not Met  Goal: Psychosocial  Outcome: Not Met  Goal: *Verbalizes anxiety and depression are reduced or absent  Outcome: Not Met  Goal: *Absence of aspiration  Outcome: Not Met  Goal: *Absence of deep venous thrombosis signs and symptoms(Stroke Metric)  Outcome: Not Met  Goal: *Optimal pain control at patient's stated goal  Outcome: Not Met  Goal: *Tolerating diet  Outcome: Not Met  Goal: *Ability to perform ADLs and demonstrates progressive mobility and function  Outcome: Not Met  Goal: *Stroke education continued(Stroke Metric)  Outcome: Not Met     Problem: Ischemic Stroke: Discharge Outcomes  Goal: *Verbalizes anxiety and depression are reduced or absent  Outcome: Not Met  Goal: *Verbalize understanding of risk factor modification(Stroke Metric)  Outcome: Not Met  Goal: *Hemodynamically stable  Outcome: Not Met  Goal: *Absence of aspiration pneumonia  Outcome: Not Met  Goal: *Aware of needed dietary changes  Outcome: Not Met  Goal: *Verbalize understanding of prescribed medications including anti-coagulants, anti-lipid, and/or anti-platelets(Stroke Metric)  Outcome: Not Met  Goal: *Tolerating diet  Outcome: Not Met  Goal: *Aware of follow-up diagnostics related to anticoagulants  Outcome: Not Met  Goal: *Ability to perform ADLs and demonstrates progressive mobility and function  Outcome: Not Met  Goal: *Absence of DVT(Stroke Metric)  Outcome: Not Met  Goal: *Absence of aspiration  Outcome: Not Met  Goal: *Optimal pain control at patient's stated goal  Outcome: Not Met  Goal: *Home safety concerns addressed  Outcome: Not Met  Goal: *Describes available resources and support systems  Outcome: Not Met  Goal: *Verbalizes understanding of activation of EMS(911) for stroke symptoms(Stroke Metric)  Outcome: Not Met  Goal: *Understands and describes signs and symptoms to report to providers(Stroke Metric)  Outcome: Not Met  Goal: *Neurolgocially stable (absence of additional neurological deficits)  Outcome: Not Met  Goal: *Verbalizes importance of follow-up with primary care physician(Stroke Metric)  Outcome: Not Met  Goal: *Smoking cessation discussed,if applicable(Stroke Metric)  Outcome: Not Met  Goal: *Depression screening completed(Stroke Metric)  Outcome: Not Met

## 2022-08-27 ENCOUNTER — APPOINTMENT (OUTPATIENT)
Dept: NON INVASIVE DIAGNOSTICS | Age: 57
DRG: 045 | End: 2022-08-27
Attending: INTERNAL MEDICINE
Payer: MEDICAID

## 2022-08-27 LAB
CHOLEST SERPL-MCNC: 246 MG/DL
EST. AVERAGE GLUCOSE BLD GHB EST-MCNC: 100 MG/DL
HBA1C MFR BLD: 5.1 % (ref 4–5.6)
HDLC SERPL-MCNC: 87 MG/DL
HDLC SERPL: 2.8 {RATIO} (ref 0–5)
LDLC SERPL CALC-MCNC: 140.2 MG/DL (ref 0–100)
LIPID PROFILE,FLP: ABNORMAL
TRIGL SERPL-MCNC: 94 MG/DL (ref ?–150)
VLDLC SERPL CALC-MCNC: 18.8 MG/DL

## 2022-08-27 PROCEDURE — 97530 THERAPEUTIC ACTIVITIES: CPT

## 2022-08-27 PROCEDURE — 74011250637 HC RX REV CODE- 250/637: Performed by: PSYCHIATRY & NEUROLOGY

## 2022-08-27 PROCEDURE — 97162 PT EVAL MOD COMPLEX 30 MIN: CPT

## 2022-08-27 PROCEDURE — 83036 HEMOGLOBIN GLYCOSYLATED A1C: CPT

## 2022-08-27 PROCEDURE — 74011250636 HC RX REV CODE- 250/636: Performed by: INTERNAL MEDICINE

## 2022-08-27 PROCEDURE — 65270000029 HC RM PRIVATE

## 2022-08-27 PROCEDURE — 74011250637 HC RX REV CODE- 250/637: Performed by: INTERNAL MEDICINE

## 2022-08-27 PROCEDURE — 36415 COLL VENOUS BLD VENIPUNCTURE: CPT

## 2022-08-27 PROCEDURE — 80061 LIPID PANEL: CPT

## 2022-08-27 PROCEDURE — 93306 TTE W/DOPPLER COMPLETE: CPT

## 2022-08-27 PROCEDURE — 97165 OT EVAL LOW COMPLEX 30 MIN: CPT

## 2022-08-27 RX ORDER — ATORVASTATIN CALCIUM 40 MG/1
40 TABLET, FILM COATED ORAL
Status: DISCONTINUED | OUTPATIENT
Start: 2022-08-27 | End: 2022-08-28

## 2022-08-27 RX ORDER — LISINOPRIL 20 MG/1
20 TABLET ORAL DAILY
Status: DISCONTINUED | OUTPATIENT
Start: 2022-08-28 | End: 2022-09-08 | Stop reason: HOSPADM

## 2022-08-27 RX ADMIN — ATORVASTATIN CALCIUM 40 MG: 40 TABLET, FILM COATED ORAL at 21:04

## 2022-08-27 RX ADMIN — AMLODIPINE BESYLATE 10 MG: 5 TABLET ORAL at 09:59

## 2022-08-27 RX ADMIN — HYDRALAZINE HYDROCHLORIDE 10 MG: 20 INJECTION, SOLUTION INTRAMUSCULAR; INTRAVENOUS at 10:21

## 2022-08-27 RX ADMIN — LISINOPRIL 10 MG: 10 TABLET ORAL at 09:59

## 2022-08-27 RX ADMIN — ASPIRIN 81 MG CHEWABLE TABLET 81 MG: 81 TABLET CHEWABLE at 09:59

## 2022-08-27 RX ADMIN — ENOXAPARIN SODIUM 40 MG: 100 INJECTION SUBCUTANEOUS at 09:58

## 2022-08-27 RX ADMIN — HYDRALAZINE HYDROCHLORIDE 10 MG: 20 INJECTION, SOLUTION INTRAMUSCULAR; INTRAVENOUS at 23:28

## 2022-08-27 RX ADMIN — HYDRALAZINE HYDROCHLORIDE 10 MG: 20 INJECTION, SOLUTION INTRAMUSCULAR; INTRAVENOUS at 00:04

## 2022-08-27 NOTE — PROGRESS NOTES
PHYSICAL THERAPY EVALUATION  Patient: Yelitza Hall (32 y.o. female)  Date: 8/27/2022  Primary Diagnosis: CVA (cerebral vascular accident) [facility]) [I63.9]       Precautions: fall,CVA with rt side weakness       ASSESSMENT  As per Dr Chilo Jarrett notes from 8/27/2022 at 1144(The patient is seen for follow  up.  58-year-old with history of essential hypertension and chronic tobacco use admitted for sudden onset right-sided weakness. Admitted for acute CVA work-up. MRI brain showed acute left corona radiata infarction.). PMH: As below. Pt A&O x 4. Please refer to flow sheet for PLOF , pt was indep  for ADLS/IADLS, no AD with mobility prior to admission. Patient exhibits rt side weakness. Please refer to OT eval for UE deficits. Patient with 2/5 MMT grossly for RLE. Isabela Denisha Based on the objective data described below, the patient presents with generalized weakness, impaired functional mobility, impaired amb, impaired balance, and endurance to activities. Pt semi supine  upon PT arrival, agreeable to evaluation. Pt required min A to sup for bed mobility, min A supine <> sit, Min A sit <> stand transfers. Pt amb 3 feet with gt belt, Rw, and Min A; demonstrating unsteady gt pattern with LOB  noted. Pt did fair with session today with PT. Pt will benefit from continued skilled PT to address above deficits and return to PLOF. Current PT DC recommendation IRF due to above deficits. Current Level of Function Impacting Discharge (mobility/balance): decreased strength. Rt UE/LE    Other factors to consider for discharge: IRF      PLAN :  Recommendations and Planned Interventions: bed mobility training, transfer training, gait training, therapeutic exercises, neuromuscular re-education, patient and family training/education, and therapeutic activities      Recommend with staff:     Frequency/Duration: Patient will be followed by physical therapy:  3-5x/week to address goals.     Recommendation for discharge: (in order for the patient to meet his/her long term goals)  Inpatient Rehabilitation Facility     This discharge recommendation:  Has been made in collaboration with the attending provider and/or case management    IF patient discharges home will need the following DME: rolling walker         SUBJECTIVE:   Patient stated I am ok.     OBJECTIVE DATA SUMMARY:   HISTORY:    History reviewed. No pertinent past medical history. History reviewed. No pertinent surgical history. Home Situation  Home Environment: Private residence  # Steps to Enter: 2  Rails to Enter: Yes  Hand Rails : Bilateral  Wheelchair Ramp: No  One/Two Story Residence: Two story  Lift Chair Available: No  Living Alone: No  Support Systems: Child(efrain)  Patient Expects to be Discharged to[de-identified] Rehab hospital/unit acute  Current DME Used/Available at Home: None    EXAMINATION/PRESENTATION/DECISION MAKING:   Critical Behavior:  Neurologic State: Alert  Orientation Level: Oriented X4  Cognition: Impaired decision making, Impulsive, Poor safety awareness  Safety/Judgement: Decreased insight into deficits, Decreased awareness of need for safety, Decreased awareness of need for assistance, Decreased awareness of environment, Fall prevention, Lack of insight into deficits  Hearing:   Auditory  Auditory Impairment: None  Skin:  intact  Edema: none  Range Of Motion:  AROM: Grossly decreased, non-functional           PROM: Generally decreased, functional           Strength:    Strength: Generally decreased, functional                    Tone & Sensation:   Tone: Normal              Sensation: Intact               Coordination:  Coordination: Grossly decreased, non-functional  Vision:   Tracking: Able to track stimulus in all quadrants w/o difficulty  Functional Mobility:  Bed Mobility:  Rolling: Contact guard assistance;Minimum assistance  Supine to Sit: Contact guard assistance;Minimum assistance  Sit to Supine: Contact guard assistance;Minimum assistance  Scooting: Stand-by assistance  Transfers:  Sit to Stand: Moderate assistance;Assist x1  Stand to Sit: Moderate assistance;Assist x1                       Balance:   Sitting: Intact; Without support  Standing: Impaired;Pull to stand; With support  Standing - Static: Poor;Fair;Constant support  Standing - Dynamic : Poor;Fair;Constant support  Ambulation/Gait Training:  Distance (ft): 3 Feet (ft)  Assistive Device: Gait belt;Walker, rolling  Ambulation - Level of Assistance: Minimal assistance; Moderate assistance     Gait Description (WDL): Exceptions to [facility]                                     Functional Measure:  [address] [facility] [address] AM-PAC 6 Clicks         Basic Mobility Inpatient Short Form  How much difficulty does the patient currently have. .. Unable A Lot A Little None   1. Turning over in bed (including adjusting bedclothes, sheets and blankets)? [] 1   [] 2   [x] 3   [] 4   2. Sitting down on and standing up from a chair with arms ( e.g., wheelchair, bedside commode, etc.)   [] 1   [x] 2   [] 3   [] 4   3. Moving from lying on back to sitting on the side of the bed? [] 1   [] 2   [x] 3   [] 4          How much help from another person does the patient currently need. .. Total A Lot A Little None   4. Moving to and from a bed to a chair (including a wheelchair)? [] 1   [x] 2   [] 3   [] 4   5. Need to walk in hospital room? [x] 1   [] 2   [] 3   [] 4   6. Climbing 3-5 steps with a railing? [x] 1   [] 2   [] 3   [] 4   © 2007, Trustees of [address] [place] [address] [address] [address], under license to [employer]. All rights reserved     Score:  Initial: 12/24 Most Recent: X (Date: 08/27/2022 )   Interpretation of Tool:  Represents activities that are increasingly more difficult (i.e. Bed mobility, Transfers, Gait).   Score 24 23 22-20 19-15 14-10 9-7 6   Modifier CH CI CJ CK CL CM CN         Physical Therapy Evaluation Charge Determination   History Examination Presentation Decision-Making   LOW Complexity : Zero comorbidities / personal factors that will impact the outcome / POC MEDIUM Complexity : 3 Standardized tests and measures addressing body structure, function, activity limitation and / or participation in recreation  MEDIUM Complexity : Evolving with changing characteristics  Other outcome measures [facility] 6        Based on the above components, the patient evaluation is determined to be of the following complexity level: MEDIUM    Pain Ratin/10     Activity Tolerance:   Fair    After treatment patient left in no apparent distress:   Bed returned to lowest position, Supine in bed and board updated. GOALS:    Problem: Mobility Impaired (Adult and Pediatric)  Goal: *Acute Goals and Plan of Care (Insert Text)  Description: Patient stated goal: feel better  Patient will move from supine to sit and sit to supine , scoot up and down, and roll side to side in bed with supervision/set-up within 7 day(s). Patient will transfer from bed to chair and chair to bed with minimal assistance/contact guard assist using the least restrictive device within 7 day(s). Patient will improve static standing balance to supervision within 1 week(s). Patient will ambulate 40 feet with minimal assistance with least restrictive device within 1 weeks. 2022 1314 by Meg Richards  Outcome: Progressing Towards Goal       COMMUNICATION/EDUCATION:   The patients plan of care was discussed with: Occupational therapist and Registered nurse. Fall prevention education was provided and the patient/caregiver indicated understanding., Patient/family have participated as able in goal setting and plan of care. , and Patient/family agree to work toward stated goals and plan of care. Thank you for this referral.  Barbra Lombard, PT.    Time Calculation: 23 mins

## 2022-08-27 NOTE — CONSULTS
NEUROLOGY CONSULT    Name Marie Libman Age 59 y.o. MRN [id number]  1965     Referring Physician: None    Chief Complaint: CVA     This is a 59 y.o. right handed -American female who was admitted through the ED overnight with right face arm and leg weakness. The symptoms started around 2:35 PM a day before coming to the hospital, that is 2022 but the patient came to the hospital yesterday. Her CT scan of the head and a CTA of the head and neck were unremarkable. MRI of the brain showed left basal ganglia infarct extending into the centrum semiovale. At the time of my evaluation this afternoon the patient still had significant weakness of the right arm more than the leg and moderate degree of facial weakness on the right. The patient is came back after getting the echo done. She had not been on any medication prior to this admission. She is a smoker but denies the use of any alcohol. Assessment and Plan:  1. Left basal ganglia/centrum semiovale acute infarct: The patient still has significant weakness of the right arm more than the leg and right facial weakness. This will need extensive physical and occupational therapies. If her echo is normal she will be ready to move to the next level which could be outpatient versus inpatient rehab.   She needs aspirin and a statin in terms of medicines    No Known Allergies     Current Facility-Administered Medications   Medication Dose Route Frequency    [START ON 2022] lisinopriL (PRINIVIL, ZESTRIL) tablet 20 mg  20 mg Oral DAILY    acetaminophen (TYLENOL) tablet 650 mg  650 mg Oral Q4H PRN    Or    acetaminophen (TYLENOL) solution 650 mg  650 mg Per NG tube Q4H PRN    Or    acetaminophen (TYLENOL) suppository 650 mg  650 mg Rectal Q4H PRN    aspirin chewable tablet 81 mg  81 mg Oral DAILY    enoxaparin (LOVENOX) injection 40 mg  40 mg SubCUTAneous Q24H    amLODIPine (NORVASC) tablet 10 mg  10 mg Oral DAILY    hydrALAZINE (APRESOLINE) 20 mg/mL injection 10 mg  10 mg IntraVENous Q6H PRN    nicotine (NICODERM CQ) 14 mg/24 hr patch 1 Patch  1 Patch TransDERmal DAILY     History reviewed. No pertinent past medical history. Social History     Tobacco Use    Smoking status: Never    Smokeless tobacco: Never   Substance Use Topics    Alcohol use: Not Currently    Drug use: Never     Exam  Visit Vitals  BP (!) 163/98   Pulse 81   Temp 98.1 °F (36.7 °C)   Resp 19   Ht 5' 6\" (1.676 m)   Wt 73.9 kg (163 lb)   SpO2 98%   BMI 26.31 kg/m²     General: Well developed, well nourished. Patient in no distress   Head: Normocephalic, atraumatic, anicteric sclera   Neck Normal ROM, No thyromegally   Lungs:  Clear to auscultation    Cardiac: Regular rate and rhythm with no murmurs. Abd: Bowel sounds were audible   Ext: No pedal edema   Skin: Supple no rash     NeurologicExam:  Mental Status: Alert and oriented to person place and time   Speech: Fluent no aphasia or dysarthria. Cranial Nerves:   Pupils are equal round and reactive to light and accommodation, extraocular movements are intact and full, visual fields are intact by confrontation, no nystagmus noted, face is asymmetric with moderate flattening of the right nasolabial fold, sensation in face is intact and symmetric, hearing is intact and symmetric, tongue and uvula are in midline with normal movements, palate is elevating equally, shoulder shrug is intact and symmetric. Motor:  Full and symmetric strength of left upper and lower ext, right upper 2-3/5, right lower 4/5. Normal bulk and tone. Reflexes:   Deep tendon reflexes 2+/4 and symmetric. Sensory:   Symmetric and intact    Gait:  Gait is deferred   Tremor:   No tremor noted. Cerebellar:  Coordination intact for finger-nose-finger with the left hand could not be done with the right due to the weakness. Neurovascular: No carotid bruits.  No JVD      Lab Review  Lab Results   Component Value Date/Time    WBC 6.8 08/26/2022 07:02 AM    HCT 38.2 08/26/2022 07:02 AM    HGB 12.2 08/26/2022 07:02 AM    PLATELET 661 64/36/8394 07:02 AM     Lab Results   Component Value Date/Time    Sodium 140 08/26/2022 07:02 AM    Potassium 3.6 08/26/2022 07:02 AM    Chloride 107 08/26/2022 07:02 AM    CO2 25 08/26/2022 07:02 AM    Glucose 124 (H) 08/26/2022 07:02 AM    BUN 5 (L) 08/26/2022 07:02 AM    Creatinine 0.70 08/26/2022 07:02 AM    Calcium 10.0 08/26/2022 07:02 AM     No results found for: B12LT, FOL, RBCF  No results found for: LDL, LDLC, DLDLP  No results found for: HBA1C, [id number], UIM8ESNB  No components found for: TROPQUANT  No results found for: EBER

## 2022-08-27 NOTE — PROGRESS NOTES
Hospitalist Progress Note         Nir Washington MD          Daily Progress Note: 2022      Subjective: The patient is seen for follow  up.  70-year-old with history of essential hypertension and chronic tobacco use admitted for sudden onset right-sided weakness. Admitted for acute CVA work-up. MRI brain showed acute left corona radiata infarction. ---  She is seen in follow-up. Reports significant difficulties with right sided weakness. Has ambulatory and gait disturbance due to recent CVA. Problem List:  Problem List as of 2022 Never Reviewed            Codes Class Noted - Resolved    CVA (cerebral vascular accident) [facility]) ICD-10-CM: I63.9  ICD-9-CM: 434.91  2022 - Present           Medications reviewed  Current Facility-Administered Medications   Medication Dose Route Frequency    acetaminophen (TYLENOL) tablet 650 mg  650 mg Oral Q4H PRN    Or    acetaminophen (TYLENOL) solution 650 mg  650 mg Per NG tube Q4H PRN    Or    acetaminophen (TYLENOL) suppository 650 mg  650 mg Rectal Q4H PRN    aspirin chewable tablet 81 mg  81 mg Oral DAILY    enoxaparin (LOVENOX) injection 40 mg  40 mg SubCUTAneous Q24H    amLODIPine (NORVASC) tablet 10 mg  10 mg Oral DAILY    lisinopriL (PRINIVIL, ZESTRIL) tablet 10 mg  10 mg Oral DAILY    hydrALAZINE (APRESOLINE) 20 mg/mL injection 10 mg  10 mg IntraVENous Q6H PRN    nicotine (NICODERM CQ) 14 mg/24 hr patch 1 Patch  1 Patch TransDERmal DAILY       Review of Systems:   A comprehensive review of systems was negative except for that written in the HPI.     Objective:   Physical Exam:     Visit Vitals  BP (!) 163/95 (BP 1 Location: Right upper arm, BP Patient Position: Sitting)   Pulse 84   Temp 98.3 °F (36.8 °C)   Resp 18   Ht 5' 6\" (1.676 m)   Wt 74.1 kg (163 lb 5.8 oz)   SpO2 92%   BMI 26.37 kg/m²      O2 Device: None (Room air)    Temp (24hrs), Av.1 °F (36.7 °C), Min:97.3 °F (36.3 °C), Max:98.5 °F (36.9 °C)    No intake/output data recorded. No intake/output data recorded. General:  Alert, cooperative, no distress, appears stated age. Lungs:   Clear to auscultation bilaterally. Chest wall:  No tenderness or deformity. Heart:  Regular rate and rhythm, S1, S2 normal, no murmur, click, rub or gallop. Abdomen:   Soft, non-tender. Bowel sounds normal. No masses,  No organomegaly. Extremities: Extremities normal, atraumatic, no cyanosis or edema. Pulses: 2+ and symmetric all extremities. Skin: Skin color, texture, turgor normal. No rashes or lesions   Neurologic: CNII-XII intact. RUE 2/5. Data Review:       Recent Days:  Recent Labs     08/26/22  0702   WBC 6.8   HGB 12.2   HCT 38.2        Recent Labs     08/26/22  0702      K 3.6      CO2 25   *   BUN 5*   CREA 0.70   CA 10.0   ALB 4.2   TBILI 0.6   ALT 20   INR 1.0     No results for input(s): PH, PCO2, PO2, HCO3, FIO2 in the last 72 hours. 24 Hour Results:  No results found for this or any previous visit (from the past 24 hour(s)). Assessment/     Acute CVA    -Involving left corona radiata    -Follow-up 2D echocardiogram    -PT OT evaluation    Uncontrolled hypertension    -Continue amlodipine 10 mg oral daily    -Increase lisinopril to 20 mg oral daily    -Hydralazine 10 mg IV every 4 hours as needed for systolic blood pressure over 150    Ambulatory and gait disturbance    -Due to acute CVA    -Will likely need inpatient rehabilitation      Plan:  Continue supportive care  Counseled against smoking cessation  Anticipate discharge to inpatient rehab    Care Plan discussed with: Patient/Family    Total time spent with patient: 30 minutes.     Radha Machado MD

## 2022-08-27 NOTE — PROGRESS NOTES
Problem: Falls - Risk of  Goal: *Absence of Falls  Description: Document Jasmeet Pond Fall Risk and appropriate interventions in the flowsheet.   Outcome: Progressing Towards Goal  Note: Fall Risk Interventions:  Mobility Interventions: Bed/chair exit alarm, PT Consult for mobility concerns, Patient to call before getting OOB         Medication Interventions: Bed/chair exit alarm, Patient to call before getting OOB    Elimination Interventions: Bed/chair exit alarm, Call light in reach              Problem: Patient Education: Go to Patient Education Activity  Goal: Patient/Family Education  Outcome: Progressing Towards Goal     Problem: Patient Education: Go to Patient Education Activity  Goal: Patient/Family Education  Outcome: Progressing Towards Goal     Problem: TIA/CVA Stroke: 0-24 hours  Goal: Off Pathway (Use only if patient is Off Pathway)  Outcome: Progressing Towards Goal  Goal: Activity/Safety  Outcome: Progressing Towards Goal  Goal: Consults, if ordered  Outcome: Progressing Towards Goal  Goal: Diagnostic Test/Procedures  Outcome: Progressing Towards Goal  Goal: Nutrition/Diet  Outcome: Progressing Towards Goal  Goal: Discharge Planning  Outcome: Progressing Towards Goal  Goal: Medications  Outcome: Progressing Towards Goal  Goal: Respiratory  Outcome: Progressing Towards Goal  Goal: Treatments/Interventions/Procedures  Outcome: Progressing Towards Goal  Goal: Minimize risk of bleeding post-thrombolytic infusion  Outcome: Progressing Towards Goal  Goal: Monitor for complications post-thrombolytic infusion  Outcome: Progressing Towards Goal  Goal: Psychosocial  Outcome: Progressing Towards Goal  Goal: *Hemodynamically stable  Outcome: Progressing Towards Goal  Goal: *Neurologically stable  Description: Absence of additional neurological deficits    Outcome: Progressing Towards Goal  Goal: *Verbalizes anxiety and depression are reduced or absent  Outcome: Progressing Towards Goal  Goal: *Absence of Signs of Aspiration on Current Diet  Outcome: Progressing Towards Goal  Goal: *Absence of deep venous thrombosis signs and symptoms(Stroke Metric)  Outcome: Progressing Towards Goal  Goal: *Ability to perform ADLs and demonstrates progressive mobility and function  Outcome: Progressing Towards Goal  Goal: *Stroke education started(Stroke Metric)  Outcome: Progressing Towards Goal  Goal: *Dysphagia screen performed(Stroke Metric)  Outcome: Progressing Towards Goal  Goal: *Rehab consulted(Stroke Metric)  Outcome: Progressing Towards Goal     Problem: TIA/CVA Stroke: Day 2 Until Discharge  Goal: Off Pathway (Use only if patient is Off Pathway)  Outcome: Progressing Towards Goal  Goal: Activity/Safety  Outcome: Progressing Towards Goal  Goal: Diagnostic Test/Procedures  Outcome: Progressing Towards Goal  Goal: Nutrition/Diet  Outcome: Progressing Towards Goal  Goal: Discharge Planning  Outcome: Progressing Towards Goal  Goal: Medications  Outcome: Progressing Towards Goal  Goal: Respiratory  Outcome: Progressing Towards Goal  Goal: Treatments/Interventions/Procedures  Outcome: Progressing Towards Goal  Goal: Psychosocial  Outcome: Progressing Towards Goal  Goal: *Verbalizes anxiety and depression are reduced or absent  Outcome: Progressing Towards Goal  Goal: *Absence of aspiration  Outcome: Progressing Towards Goal  Goal: *Absence of deep venous thrombosis signs and symptoms(Stroke Metric)  Outcome: Progressing Towards Goal  Goal: *Optimal pain control at patient's stated goal  Outcome: Progressing Towards Goal  Goal: *Tolerating diet  Outcome: Progressing Towards Goal  Goal: *Ability to perform ADLs and demonstrates progressive mobility and function  Outcome: Progressing Towards Goal  Goal: *Stroke education continued(Stroke Metric)  Outcome: Progressing Towards Goal     Problem: Ischemic Stroke: Discharge Outcomes  Goal: *Verbalizes anxiety and depression are reduced or absent  Outcome: Progressing Towards Goal  Goal: *Verbalize understanding of risk factor modification(Stroke Metric)  Outcome: Progressing Towards Goal  Goal: *Hemodynamically stable  Outcome: Progressing Towards Goal  Goal: *Absence of aspiration pneumonia  Outcome: Progressing Towards Goal  Goal: *Aware of needed dietary changes  Outcome: Progressing Towards Goal  Goal: *Verbalize understanding of prescribed medications including anti-coagulants, anti-lipid, and/or anti-platelets(Stroke Metric)  Outcome: Progressing Towards Goal  Goal: *Tolerating diet  Outcome: Progressing Towards Goal  Goal: *Aware of follow-up diagnostics related to anticoagulants  Outcome: Progressing Towards Goal  Goal: *Ability to perform ADLs and demonstrates progressive mobility and function  Outcome: Progressing Towards Goal  Goal: *Absence of DVT(Stroke Metric)  Outcome: Progressing Towards Goal  Goal: *Absence of aspiration  Outcome: Progressing Towards Goal  Goal: *Optimal pain control at patient's stated goal  Outcome: Progressing Towards Goal  Goal: *Home safety concerns addressed  Outcome: Progressing Towards Goal  Goal: *Describes available resources and support systems  Outcome: Progressing Towards Goal  Goal: *Verbalizes understanding of activation of EMS(911) for stroke symptoms(Stroke Metric)  Outcome: Progressing Towards Goal  Goal: *Understands and describes signs and symptoms to report to providers(Stroke Metric)  Outcome: Progressing Towards Goal  Goal: *Neurolgocially stable (absence of additional neurological deficits)  Outcome: Progressing Towards Goal  Goal: *Verbalizes importance of follow-up with primary care physician(Stroke Metric)  Outcome: Progressing Towards Goal  Goal: *Smoking cessation discussed,if applicable(Stroke Metric)  Outcome: Progressing Towards Goal  Goal: *Depression screening completed(Stroke Metric)  Outcome: Progressing Towards Goal

## 2022-08-27 NOTE — PROGRESS NOTES
Problem: Self Care Deficits Care Plan (Adult)  Goal: *Acute Goals and Plan of Care (Insert Text)  Description: Pt will be independence self feeding  Pt will be independence sup<->sit in prep for EOB ADL's  Pt will be independence  LB dressing EOB level  Pt will be independence  sit EOB 10 minutes in prep for EOB ADL's  Pt will be independence  sit<-> prep for toilet transfer  Pt will be independence  BSC/toilet transfer with LRAD  Pt will be independence  toileting/cloth mgmt LRAD  Pt will be independence  grooming standing sink  Pt will be independence bathing sitting/standing sink LRAD  Pt will be MI sulema UE HEP in prep for self care tasks    Outcome: Not Met     OCCUPATIONAL THERAPY EVALUATION  Patient: Kalyn Santiago (64 y.o. female)  Date: 8/27/2022  Primary Diagnosis: CVA (cerebral vascular accident) [facility]) [I63.9]       Precautions: falls        ASSESSMENT  Based on the objective data, patient presents with generalized weakness, R sided decreased strength/ROM/coordination, deficits in balance, mobility, transfers, and ADLs. Patient requires SBA bed level mobility and MOD A functional balance and standing for transfers. Patient presents  with decreased insight into deficits, safety awareness, and need for assistance. Decreased functional use of RUE for ADL routines, modified to seated level. Pt would benefit from skilled OT services while at [facility] in order to increase safety and independence with self care and functional transfers/mobility. Recommend discharge to IRF when medically appropriate. Other factors to consider for discharge: DME, severity of symptoms        PLAN :  Recommendations and Planned Interventions: self care training, functional mobility training, therapeutic exercise, balance training, home safety training, and family training/education    Frequency/Duration: Patient will be followed by occupational therapy 3-5x/week to address goals.     Recommendation for discharge: (in order for the patient to meet his/her long term goals)  [address]     This discharge recommendation:  Has been made in collaboration with the attending provider and/or case management    IF patient discharges home will need the following DME: TBD       SUBJECTIVE:   Patient stated I'm doing fine.     OBJECTIVE DATA SUMMARY:   HISTORY:   History reviewed. No pertinent past medical history. History reviewed. No pertinent surgical history. Expanded or extensive additional review of patient history:     Home Situation  Home Environment: Private residence  # Steps to Enter: 2  Rails to Enter: Yes  Hand Rails : Bilateral  Wheelchair Ramp: No  One/Two Story Residence: Two story  Lift Chair Available: No  Living Alone: No  Support Systems: Child(efrain)  Patient Expects to be Discharged to[de-identified] Rehab hospital/unit acute  Current DME Used/Available at Home: None    PLOF: Pt IND for ADLS/IADLS, IND with mobility prior to admission. Hand dominance: Right    EXAMINATION OF PERFORMANCE DEFICITS:  Cognitive/Behavioral Status:  Neurologic State: Alert  Orientation Level: Oriented X4  Cognition: Impaired decision making; Impulsive;Poor safety awareness  Perception: Appears intact  Perseveration: No perseveration noted  Safety/Judgement: Decreased insight into deficits; Decreased awareness of need for safety;Decreased awareness of need for assistance;Decreased awareness of environment; Fall prevention;Lack of insight into deficits    Skin: INTACT    Edema: NONE    Hearing: Auditory  Auditory Impairment: None    Vision/Perceptual:    Tracking: Able to track stimulus in all quadrants w/o difficulty      Range of Motion:  AROM: Grossly decreased, non-functional  PROM: Generally decreased, functional    Strength:  Strength: Generally decreased, functional    Coordination:  Coordination: Grossly decreased, non-functional    Tone & Sensation:  Tone: Normal  Sensation: Intact    Balance:  Sitting: Intact; Without support  Standing: Impaired; With support  Standing - Static: Constant support;Poor  Standing - Dynamic : Constant support;Poor    Functional Mobility and Transfers for ADLs:  Bed Mobility:  Supine to Sit: Stand-by assistance  Sit to Supine: Stand-by assistance  Scooting: Stand-by assistance    Transfers:  Sit to Stand: Moderate assistance;Assist x1  Stand to Sit: Moderate assistance;Assist x1  Toilet Transfer : Moderate assistance  Assistive Device : Gait Belt    ADL Assessment:  Feeding: Supervision    Oral Facial Hygiene/Grooming: Setup;Supervision    Bathing: Stand-by assistance    Upper Body Dressing: Supervision    Lower Body Dressing: Stand-by assistance    Toileting: Moderate assistance    ADL Intervention and task modifications:  Feeding  Container Management: Supervision  Cutting Food: Supervision  Drink to Mouth: Supervision    Grooming  Position Performed: Seated edge of bed  Washing Face: Set-up  Washing Hands: Set-up  Brushing Teeth: Set-up    Lower Body Dressing Assistance  Socks: Stand-by assistance    Toileting  Toileting Assistance: Moderate assistance    Cognitive Retraining  Safety/Judgement: Decreased insight into deficits; Decreased awareness of need for safety;Decreased awareness of need for assistance;Decreased awareness of environment; Fall prevention;Lack of insight into deficits      [address] [place] [address] [address] [id number] AM-PACTM \"6 Clicks\"                                                       Daily Activity Inpatient Short Form  How much help from another person does the patient currently need. .. Total; A Lot A Little None   1. Putting on and taking off regular lower body clothing? []  1 []  2 [x]  3 []  4   2. Bathing (including washing, rinsing, drying)? []  1 []  2 [x]  3 []  4   3. Toileting, which includes using toilet, bedpan or urinal? [] 1 []  2 [x]  3 []  4   4. Putting on and taking off regular upper body clothing? []  1 []  2 [x]  3 []  4   5.   Taking care of personal grooming such as brushing teeth? []  1 []  2 [x]  3 []  4   6. Eating meals? []  1 []  2 [x]  3 []  4   © , Trustees of [address] [place] [address] [address] [address], under license to [employer]. All rights reserved     Score: 18/24     Interpretation of Tool:  Represents clinically-significant functional categories (i.e. Activities of daily living). Percentage of Impairment CH    0%   CI    1-19% CJ    20-39% CK    40-59% CL    60-79% CM    80-99% CN     100%   [facility]  Score 6-24 24 23 20-22 15-19 10-14 7-9 6        Occupational Therapy Evaluation Charge Determination   History Examination Decision-Making   LOW Complexity : Brief history review  MEDIUM Complexity : 3-5 performance deficits relating to physical, cognitive , or psychosocial skils that result in activity limitations and / or participation restrictions MEDIUM Complexity : Patient may present with comorbidities that affect occupational performnce. Miniml to moderate modification of tasks or assistance (eg, physical or verbal ) with assesment(s) is necessary to enable patient to complete evaluation       Based on the above components, the patient evaluation is determined to be of the following complexity level: LOW   Pain Ratin/10    Activity Tolerance:   Good  Please refer to the flowsheet for vital signs taken during this treatment. After treatment patient left in no apparent distress:    Supine in bed and Bed / chair alarm activated    COMMUNICATION/EDUCATION:   The patients plan of care was discussed with: Physical therapist and Registered nurse. Home safety education was provided and the patient/caregiver indicated understanding. This patients plan of care is appropriate for delegation to [facility].     Thank you for this referral.  Leobardo Ca OT  Time Calculation: 30 mins

## 2022-08-28 LAB
ECHO AO ROOT DIAM: 3.6 CM
ECHO AO ROOT INDEX: 1.97 CM/M2
ECHO AV AREA PEAK VELOCITY: 2.4 CM2
ECHO AV AREA/BSA PEAK VELOCITY: 1.3 CM2/M2
ECHO AV PEAK GRADIENT: 10 MMHG
ECHO AV PEAK VELOCITY: 1.6 M/S
ECHO AV VELOCITY RATIO: 0.75
ECHO EST RA PRESSURE: 3 MMHG
ECHO LA AREA 2C: 19.2 CM2
ECHO LA AREA 4C: 21 CM2
ECHO LA DIAMETER INDEX: 1.42 CM/M2
ECHO LA DIAMETER: 2.6 CM
ECHO LA MAJOR AXIS: 5 CM
ECHO LA MINOR AXIS: 5.4 CM
ECHO LA TO AORTIC ROOT RATIO: 0.72
ECHO LA VOL BP: 63 ML (ref 22–52)
ECHO LA VOL/BSA BIPLANE: 34 ML/M2 (ref 16–34)
ECHO LV E' LATERAL VELOCITY: 9 CM/S
ECHO LV E' SEPTAL VELOCITY: 3 CM/S
ECHO LV FRACTIONAL SHORTENING: 39 % (ref 28–44)
ECHO LV INTERNAL DIMENSION DIASTOLE INDEX: 2.51 CM/M2
ECHO LV INTERNAL DIMENSION DIASTOLIC: 4.6 CM (ref 3.9–5.3)
ECHO LV INTERNAL DIMENSION SYSTOLIC INDEX: 1.53 CM/M2
ECHO LV INTERNAL DIMENSION SYSTOLIC: 2.8 CM
ECHO LV IVSD: 1.1 CM (ref 0.6–0.9)
ECHO LV MASS 2D: 169.9 G (ref 67–162)
ECHO LV MASS INDEX 2D: 92.8 G/M2 (ref 43–95)
ECHO LV POSTERIOR WALL DIASTOLIC: 1 CM (ref 0.6–0.9)
ECHO LV RELATIVE WALL THICKNESS RATIO: 0.43
ECHO LVOT AREA: 3.1 CM2
ECHO LVOT DIAM: 2 CM
ECHO LVOT PEAK GRADIENT: 6 MMHG
ECHO LVOT PEAK VELOCITY: 1.2 M/S
ECHO MV A VELOCITY: 1.23 M/S
ECHO MV E DECELERATION TIME (DT): 198 MS
ECHO MV E VELOCITY: 0.58 M/S
ECHO MV E/A RATIO: 0.47
ECHO MV E/E' LATERAL: 6.44
ECHO MV E/E' RATIO (AVERAGED): 12.89
ECHO MV E/E' SEPTAL: 19.33
ECHO PV MAX VELOCITY: 1 M/S
ECHO PV PEAK GRADIENT: 4 MMHG
ECHO RA AREA 4C: 12 CM2
ECHO RA END SYSTOLIC VOLUME APICAL 4 CHAMBER INDEX BSA: 15 ML/M2
ECHO RA VOLUME: 27 ML
ECHO RIGHT VENTRICULAR SYSTOLIC PRESSURE (RVSP): 24 MMHG
ECHO RV INTERNAL DIMENSION: 3.1 CM
ECHO RV TAPSE: 1.9 CM (ref 1.7–?)
ECHO TV REGURGITANT MAX VELOCITY: 2.3 M/S
ECHO TV REGURGITANT PEAK GRADIENT: 21 MMHG

## 2022-08-28 PROCEDURE — 97110 THERAPEUTIC EXERCISES: CPT

## 2022-08-28 PROCEDURE — 74011250637 HC RX REV CODE- 250/637: Performed by: INTERNAL MEDICINE

## 2022-08-28 PROCEDURE — 65270000029 HC RM PRIVATE

## 2022-08-28 PROCEDURE — 74011250636 HC RX REV CODE- 250/636: Performed by: INTERNAL MEDICINE

## 2022-08-28 PROCEDURE — 97116 GAIT TRAINING THERAPY: CPT

## 2022-08-28 RX ORDER — ATORVASTATIN CALCIUM 40 MG/1
80 TABLET, FILM COATED ORAL
Status: DISCONTINUED | OUTPATIENT
Start: 2022-08-28 | End: 2022-09-08 | Stop reason: HOSPADM

## 2022-08-28 RX ADMIN — ENOXAPARIN SODIUM 40 MG: 100 INJECTION SUBCUTANEOUS at 08:22

## 2022-08-28 RX ADMIN — ASPIRIN 81 MG CHEWABLE TABLET 81 MG: 81 TABLET CHEWABLE at 08:23

## 2022-08-28 RX ADMIN — AMLODIPINE BESYLATE 10 MG: 5 TABLET ORAL at 08:23

## 2022-08-28 RX ADMIN — ATORVASTATIN CALCIUM 80 MG: 40 TABLET, FILM COATED ORAL at 22:05

## 2022-08-28 RX ADMIN — LISINOPRIL 20 MG: 20 TABLET ORAL at 08:23

## 2022-08-28 NOTE — PROGRESS NOTES
Problem: Mobility Impaired (Adult and Pediatric)  Goal: *Acute Goals and Plan of Care (Insert Text)  Description: Patient stated goal: feel better  Patient will move from supine to sit and sit to supine , scoot up and down, and roll side to side in bed with supervision/set-up within 7 day(s). Patient will transfer from bed to chair and chair to bed with minimal assistance/contact guard assist using the least restrictive device within 7 day(s). Patient will improve static standing balance to supervision within 1 week(s). Patient will ambulate 40 feet with minimal assistance with least restrictive device within 1 weeks. Outcome: Progressing Towards Goal   PHYSICAL THERAPY TREATMENT  Patient: Marie Libman (21 y.o. female)  Date: 8/28/2022  Diagnosis: CVA (cerebral vascular accident) [facility]) [I63.9] <principal problem not specified>      Precautions:    Chart, physical therapy assessment, plan of care and goals were reviewed. ASSESSMENT  Patient continues with skilled PT services and is progressing towards goals. Pt received semi supine in bed, pleasant and agreeable to Tx. Pt completed LE ex in supine without difficulty but needing cues for pacing. Pt completed bed mobility rolling to L, supine to sitting and scooting to EOB with SBA and additional time. On EOB, pt completed more ex, LAQ and ankle pumps. Pt stood to [facility] with mod A overall and A to bring R hand on FWW and . Pt amb within room 30 ft using the FWW with mod A and much cues for pacing and sequencing of movements. Pt eager to amb and trying to go too fast, causing LOB. Pt sat EOB with mod A then went back to supine and scooted to [facility] with SBA and additional time. Pt was left semi supine in bed in NAD, call bell within reach, all needs addressed.     Current Level of Function Impacting Discharge (mobility/balance): assist x 1, poor balance    Other factors to consider for discharge: severity of deficits, safety, home environment and support available, new onset of Dx         PLAN :  Patient continues to benefit from skilled intervention to address the above impairments. Continue treatment per established plan of care. to address goals. Recommendation for discharge: (in order for the patient to meet his/her long term goals)  [address]     This discharge recommendation:  Has been made in collaboration with the attending provider and/or case management    IF patient discharges home will need the following DME: to be determined (TBD)       SUBJECTIVE:   Patient stated I want to get OOB.     OBJECTIVE DATA SUMMARY:   Critical Behavior:  Neurologic State: Alert  Orientation Level: Oriented X4  Cognition: Impulsive, Appropriate safety awareness  Safety/Judgement: Decreased insight into deficits, Decreased awareness of need for safety, Decreased awareness of need for assistance, Decreased awareness of environment, Fall prevention, Lack of insight into deficits  Functional Mobility Training:  Bed Mobility:  Rolling: Stand-by assistance; Additional time  Supine to Sit: Stand-by assistance; Additional time  Sit to Supine: Stand-by assistance; Additional time  Scooting: Stand-by assistance     Transfers:  Sit to Stand: Moderate assistance  Stand to Sit: Moderate assistance     Balance:  Sitting: Impaired  Sitting - Static: Fair (occasional)  Sitting - Dynamic: Fair (occasional)  Standing: Impaired  Standing - Static: Fair;Constant support  Standing - Dynamic : Poor;Constant support    Ambulation/Gait Training:  Distance (ft): 30 Feet (ft)  Assistive Device: Walker, rolling;Gait belt  Ambulation - Level of Assistance:  Moderate assistance    Therapeutic Exercises:       EXERCISE   Sets   Reps   Active Active Assist   Passive Self ROM   Comments   Ankle Pumps 1 20 [x] [] [] []    Heel Slides 1 10 [x] [] [] []    Straight Leg Raises 1 10 [x] [] [] []    Hip abd/add 1 10 [x] [] [] []    Long Arc Quads 1 10 [x] [] [] []    Bridges 1 10 [x] [] [] []      Pain Ratin/10    Activity Tolerance:   Bed locked and in lowest position Fair  Please refer to the flowsheet for vital signs taken during this treatment. After treatment patient left in no apparent distress:   Bed returned to lowest position, Supine in bed, Call bell within reach, Bed / chair alarm activated, and Side rails x 3    COMMUNICATION/COLLABORATION:   The patients plan of care was discussed with: Registered nurse and Certified nursing assistant/patient care technician.      Chauncey Ray PTA   Time Calculation: 29 mins

## 2022-08-28 NOTE — PROGRESS NOTES
Neurology brief note    The patient work-up is complete and her echocardiogram revealed normal EF. Bubble study could not be done due to the lack of IV access. The patient can be discharged from neurology standpoint on aspirin and atorvastatin and follow-up in the clinic in 6 to 8 weeks.     Thank you,

## 2022-08-28 NOTE — PROGRESS NOTES
Hospitalist Progress Note         Michael Coleman MD          Daily Progress Note: 2022      Subjective: The patient is seen for follow  up.  73-year-old with history of essential hypertension and chronic tobacco use admitted for sudden onset right-sided weakness. Admitted for acute CVA work-up. MRI brain showed acute left corona radiata infarction. ---  She is seen in follow-up. Reports significant difficulties with right sided weakness. Has ambulatory and gait disturbance due to recent CVA. Problem List:  Problem List as of 2022 Never Reviewed            Codes Class Noted - Resolved    CVA (cerebral vascular accident) [facility]) ICD-10-CM: I63.9  ICD-9-CM: 434.91  2022 - Present         Medications reviewed  Current Facility-Administered Medications   Medication Dose Route Frequency    atorvastatin (LIPITOR) tablet 80 mg  80 mg Oral QHS    lisinopriL (PRINIVIL, ZESTRIL) tablet 20 mg  20 mg Oral DAILY    acetaminophen (TYLENOL) tablet 650 mg  650 mg Oral Q4H PRN    Or    acetaminophen (TYLENOL) solution 650 mg  650 mg Per NG tube Q4H PRN    Or    acetaminophen (TYLENOL) suppository 650 mg  650 mg Rectal Q4H PRN    aspirin chewable tablet 81 mg  81 mg Oral DAILY    enoxaparin (LOVENOX) injection 40 mg  40 mg SubCUTAneous Q24H    amLODIPine (NORVASC) tablet 10 mg  10 mg Oral DAILY    hydrALAZINE (APRESOLINE) 20 mg/mL injection 10 mg  10 mg IntraVENous Q6H PRN    nicotine (NICODERM CQ) 14 mg/24 hr patch 1 Patch  1 Patch TransDERmal DAILY       Review of Systems:   A comprehensive review of systems was negative except for that written in the HPI.     Objective:   Physical Exam:     Visit Vitals  /73 (BP 1 Location: Left upper arm, BP Patient Position: At rest;Supine)   Pulse 99   Temp 98 °F (36.7 °C)   Resp 18   Ht 5' 6\" (1.676 m)   Wt 73.9 kg (163 lb)   SpO2 94%   BMI 26.31 kg/m²      O2 Device: None (Room air)    Temp (24hrs), Av °F (36.7 °C), Min:97.1 °F (36.2 °C), Max:99 °F (37.2 °C)    No intake/output data recorded. No intake/output data recorded. General:  Alert, cooperative, no distress, appears stated age. Lungs:   Clear to auscultation bilaterally. Chest wall:  No tenderness or deformity. Heart:  Regular rate and rhythm, S1, S2 normal, no murmur, click, rub or gallop. Abdomen:   Soft, non-tender. Bowel sounds normal. No masses,  No organomegaly. Extremities: Extremities normal, atraumatic, no cyanosis or edema. Pulses: 2+ and symmetric all extremities. Skin: Skin color, texture, turgor normal. No rashes or lesions   Neurologic: CNII-XII intact. RUE 2/5. Data Review:       Recent Days:  Recent Labs     08/26/22  0702   WBC 6.8   HGB 12.2   HCT 38.2          Recent Labs     08/26/22  0702      K 3.6      CO2 25   *   BUN 5*   CREA 0.70   CA 10.0   ALB 4.2   TBILI 0.6   ALT 20   INR 1.0       No results for input(s): PH, PCO2, PO2, HCO3, FIO2 in the last 72 hours.     24 Hour Results:  Recent Results (from the past 24 hour(s))   ECHO ADULT COMPLETE    Collection Time: 08/27/22 11:46 AM   Result Value Ref Range    LA Minor Axis 5.4 cm    LA Major Axis 5.0 cm    LA Area 2C 19.2 cm2    LA Area 4C 21.0 cm2    LA Volume BP 63 (A) 22 - 52 mL    LA Diameter 2.6 cm    RA Area 4C 12.0 cm2    RA Volume 27 ml    Est. RA Pressure 3 mmHg    AV Peak Velocity 1.6 m/s    AV Peak Gradient 10 mmHg    AV Area by Peak Velocity 2.4 cm2    Aortic Root 3.6 cm    IVSd 1.1 (A) 0.6 - 0.9 cm    LVIDd 4.6 3.9 - 5.3 cm    LVIDs 2.8 cm    LVOT Diameter 2.0 cm    LVOT Peak Velocity 1.2 m/s    LVOT Peak Gradient 6 mmHg    LVPWd 1.0 (A) 0.6 - 0.9 cm    LV E' Lateral Velocity 9 cm/s    LV E' Septal Velocity 3 cm/s    LVOT Area 3.1 cm2    MV E Wave Deceleration Time 198.0 ms    MV A Velocity 1.23 m/s    MV E Velocity 0.58 m/s    PV Max Velocity 1.0 m/s    PV Peak Gradient 4 mmHg    RVIDd 3.1 cm    TAPSE 1.9 1.7 cm    TR Max Velocity 2.30 m/s    TR Peak Gradient 21 mmHg    Fractional Shortening 2D 39 28 - 44 %    LVIDd Index 2.51 cm/m2    LVIDs Index 1.53 cm/m2    LV RWT Ratio 0.43     LV Mass 2D 169.9 (A) 67 - 162 g    LV Mass 2D Index 92.8 43 - 95 g/m2    MV E/A 0.47     E/E' Ratio (Averaged) 12.89     E/E' Lateral 6.44     E/E' Septal 19.33     LA Volume Index BP 34 16 - 34 ml/m2    LA Size Index 1.42 cm/m2    LA/AO Root Ratio 0.72     RA Volume Index A4C 15 mL/m2    Ao Root Index 1.97 cm/m2    AV Velocity Ratio 0.75     ANA/BSA Peak Velocity 1.3 cm2/m2    RVSP 24 mmHg           Assessment/     Acute CVA    -Involving left corona radiata    -Follow-up 2D echocardiogram    -PT OT evaluation    Uncontrolled hypertension    -Continue amlodipine 10 mg oral daily    -Increase lisinopril to 20 mg oral daily    -Hydralazine 10 mg IV every 4 hours as needed for systolic blood pressure over 150    Ambulatory and gait disturbance    -Due to acute CVA    -Will likely need inpatient rehabilitation      Plan:  Continue supportive care  Counseled against smoking cessation  Anticipate discharge to inpatient rehab when bed available  Medically stable for discharge    Care Plan discussed with: Patient/Family    Total time spent with patient: 30 minutes.     Soham Bob MD

## 2022-08-29 PROCEDURE — 97116 GAIT TRAINING THERAPY: CPT

## 2022-08-29 PROCEDURE — 97530 THERAPEUTIC ACTIVITIES: CPT

## 2022-08-29 PROCEDURE — 74011250637 HC RX REV CODE- 250/637: Performed by: INTERNAL MEDICINE

## 2022-08-29 PROCEDURE — 97110 THERAPEUTIC EXERCISES: CPT

## 2022-08-29 PROCEDURE — 65270000029 HC RM PRIVATE

## 2022-08-29 PROCEDURE — 92522 EVALUATE SPEECH PRODUCTION: CPT

## 2022-08-29 PROCEDURE — 74011250636 HC RX REV CODE- 250/636: Performed by: INTERNAL MEDICINE

## 2022-08-29 RX ADMIN — ASPIRIN 81 MG CHEWABLE TABLET 81 MG: 81 TABLET CHEWABLE at 11:06

## 2022-08-29 RX ADMIN — ATORVASTATIN CALCIUM 80 MG: 40 TABLET, FILM COATED ORAL at 20:15

## 2022-08-29 RX ADMIN — LISINOPRIL 20 MG: 20 TABLET ORAL at 11:05

## 2022-08-29 RX ADMIN — ENOXAPARIN SODIUM 40 MG: 100 INJECTION SUBCUTANEOUS at 11:05

## 2022-08-29 RX ADMIN — AMLODIPINE BESYLATE 10 MG: 5 TABLET ORAL at 11:06

## 2022-08-29 NOTE — PROGRESS NOTES
Hospitalist Progress Note         Hong Weir MD          Daily Progress Note: 2022      Subjective: The patient is seen for follow  up.  61-year-old with history of essential hypertension and chronic tobacco use admitted for sudden onset right-sided weakness. Admitted for acute CVA work-up. MRI brain showed acute left corona radiata infarction. ---  She is seen in follow-up. Reports significant difficulties with right sided weakness. Has ambulatory and gait disturbance due to recent CVA. Problem List:  Problem List as of 2022 Never Reviewed            Codes Class Noted - Resolved    CVA (cerebral vascular accident) [facility]) ICD-10-CM: I63.9  ICD-9-CM: 434.91  2022 - Present       Medications reviewed  Current Facility-Administered Medications   Medication Dose Route Frequency    atorvastatin (LIPITOR) tablet 80 mg  80 mg Oral QHS    lisinopriL (PRINIVIL, ZESTRIL) tablet 20 mg  20 mg Oral DAILY    acetaminophen (TYLENOL) tablet 650 mg  650 mg Oral Q4H PRN    Or    acetaminophen (TYLENOL) solution 650 mg  650 mg Per NG tube Q4H PRN    Or    acetaminophen (TYLENOL) suppository 650 mg  650 mg Rectal Q4H PRN    aspirin chewable tablet 81 mg  81 mg Oral DAILY    enoxaparin (LOVENOX) injection 40 mg  40 mg SubCUTAneous Q24H    amLODIPine (NORVASC) tablet 10 mg  10 mg Oral DAILY    hydrALAZINE (APRESOLINE) 20 mg/mL injection 10 mg  10 mg IntraVENous Q6H PRN    nicotine (NICODERM CQ) 14 mg/24 hr patch 1 Patch  1 Patch TransDERmal DAILY       Review of Systems:   A comprehensive review of systems was negative except for that written in the HPI.     Objective:   Physical Exam:     Visit Vitals  /83 (BP 1 Location: Left upper arm, BP Patient Position: At rest)   Pulse 98   Temp 98.5 °F (36.9 °C)   Resp 16   Ht 5' 6\" (1.676 m)   Wt 73.9 kg (163 lb)   SpO2 98%   BMI 26.31 kg/m²      O2 Device: None (Room air)    Temp (24hrs), Av.3 °F (36.8 °C), Min:97.8 °F (36.6 °C), Max:98.6 °F (37 °C)    No intake/output data recorded. No intake/output data recorded. General:  Alert, cooperative, no distress, appears stated age. Lungs:   Clear to auscultation bilaterally. Chest wall:  No tenderness or deformity. Heart:  Regular rate and rhythm, S1, S2 normal, no murmur, click, rub or gallop. Abdomen:   Soft, non-tender. Bowel sounds normal. No masses,  No organomegaly. Extremities: Extremities normal, atraumatic, no cyanosis or edema. Pulses: 2+ and symmetric all extremities. Skin: Skin color, texture, turgor normal. No rashes or lesions   Neurologic: CNII-XII intact. RUE 2/5. Data Review:       Recent Days:  No results for input(s): WBC, HGB, HCT, PLT, HGBEXT, HCTEXT, PLTEXT, HGBEXT, HCTEXT, PLTEXT in the last 72 hours. No results for input(s): NA, K, CL, CO2, GLU, BUN, CREA, CA, MG, PHOS, ALB, TBIL, TBILI, ALT, INR, INREXT, INREXT in the last 72 hours. No lab exists for component: SGOT    No results for input(s): PH, PCO2, PO2, HCO3, FIO2 in the last 72 hours. 24 Hour Results:  No results found for this or any previous visit (from the past 24 hour(s)). Assessment/     Acute CVA    -Involving left corona radiata    -Follow-up 2D echocardiogram    -PT OT evaluation    Uncontrolled hypertension    -Continue amlodipine 10 mg oral daily    -Increase lisinopril to 20 mg oral daily    -Hydralazine 10 mg IV every 4 hours as needed for systolic blood pressure over 150    Ambulatory and gait disturbance    -Due to acute CVA    -Will likely need inpatient rehabilitation      Plan:  Continue supportive care  Counseled against smoking cessation  Anticipate discharge to inpatient rehab when bed available  Medically stable for discharge    Care Plan discussed with: Patient/Family    Total time spent with patient: 30 minutes.     Michael Coleman MD

## 2022-08-29 NOTE — PROGRESS NOTES
PHYSICAL THERAPY TREATMENT  Patient: Radha Krishna (22 y.o. female)  Date: 8/29/2022  Diagnosis: CVA (cerebral vascular accident) [facility]) [I63.9] <principal problem not specified>      Precautions:    Chart, physical therapy assessment, plan of care and goals were reviewed. ASSESSMENT  Patient continues with skilled PT services and is progressing towards goals. Pt sitting in chair upon arrival and agreeable to therapy. Due to higher BP this morning SPTA checked pts BP, 145/96. Pt sit <> stand Min A with RW and additional help with R hand to  walker. Pt ambulated with RW approximately 108ft Min/ Mod A in the hallway with and unsteady gait, having issues clearing the R foot and demonstrated a steppage gait to clear the floor, R knee buckling, hyperextension of R knee, and LOB x2 noted with Mod A to recover balance. Pt returned to the room and sat up in chair to performed therex, see chart below. Pt transferred from chair > bed with Min A and sit > supine Min A to lift RLE. Pt left supine in bed with family present. Current Level of Function Impacting Discharge (mobility/balance): Min/Mod A for mobility    Other factors to consider for discharge: safety, PLOF, decreased mobility, impulsiveness, assistance at home          PLAN :  Patient continues to benefit from skilled intervention to address the above impairments. Continue treatment per established plan of care. to address goals. Recommendation for discharge: (in order for the patient to meet his/her long term goals)  [address] [facility],[address]     This discharge recommendation:  Has been made in collaboration with the attending provider and/or case management    IF patient discharges home will need the following DME: rolling walker and to be determined (TBD)       SUBJECTIVE:   Patient stated I can go further.     OBJECTIVE DATA SUMMARY:   Critical Behavior:  Neurologic State: Alert  Orientation Level: Oriented X4  Cognition: Follows commands, Impulsive, Poor safety awareness  Safety/Judgement: Decreased insight into deficits  Functional Mobility Training:    Transfers:  Sit to Stand: Minimum assistance  Stand to Sit: Minimum assistance    Balance:  Sitting: Intact; With support  Sitting - Static: Fair (occasional)  Sitting - Dynamic: Fair (occasional)  Standing: Impaired; With support  Standing - Static: Fair;Constant support  Standing - Dynamic : Fair;Constant support    Ambulation/Gait Training:  Distance (ft): 108 Feet (ft)  Assistive Device: Walker, rolling;Gait belt  Ambulation - Level of Assistance: Minimal assistance; Moderate assistance  Gait Abnormalities: Steppage gait; Step to gait (RLE)  Base of Support: Narrowed      Therapeutic Exercises:       EXERCISE   Sets   Reps   Active Active Assist   Passive Self ROM   Comments   Ankle Pumps  10 [x] [] [] []    Long Arc Quads  10 [x] [] [] []    Marching  10 [x] [] [] []       Pain Ratin/10    Activity Tolerance:   Fair  Please refer to the flowsheet for vital signs taken during this treatment. After treatment patient left in no apparent distress:   Bed returned to lowest position, Supine in bed, Call bell within reach, Caregiver / family present, and Side rails x 3    COMMUNICATION/COLLABORATION:   The patients plan of care was discussed with: Registered nurse and Case management. HOLDEN Durán   Time Calculation: 38 mins    KASANDRA Durán, under direct supervision of Luca Dotson PTA provided care and treatment of pt with verbal consent from pt received. Problem: Mobility Impaired (Adult and Pediatric)  Goal: *Acute Goals and Plan of Care (Insert Text)  Description: Patient stated goal: feel better  Patient will move from supine to sit and sit to supine , scoot up and down, and roll side to side in bed with supervision/set-up within 7 day(s).     Patient will transfer from bed to chair and chair to bed with minimal assistance/contact guard assist using the least restrictive device within 7 day(s). Patient will improve static standing balance to supervision within 1 week(s). Patient will ambulate 40 feet with minimal assistance with least restrictive device within 1 weeks.      Outcome: Progressing Towards Goal

## 2022-08-29 NOTE — PROGRESS NOTES
OCCUPATIONAL THERAPY TREATMENT  Patient: Yelitza Hall (39 y.o. female)  Date: 8/29/2022  Diagnosis: CVA (cerebral vascular accident) [facility]) [I63.9] <principal problem not specified>      Precautions: fall risk    Chart, occupational therapy assessment, plan of care, and goals were reviewed. ASSESSMENT  Patient continues with skilled OT services and is progressing towards goals. Pt received in semi-supine upon arrival and agreeable to OT tx. Pt req'd min A for bed mobility and sup>sit transfer (req'd A for LB). She demo'd intact sitting balance at EOB. OT educated pt on goldie dressing technique for UB/LB dressing; she req'd min A for pullover shirt and cues for sequencing. Pt completed PROM of RUE using LUE including 10 reps of R elbow flex/ext, wrist flex/ext, and finger flex/ext; OT educated pt to complete these exs 3x/day. She stood w/ min A using RW and ambulated within room w/ min-mod A for safety; pt able to bring RUE onto walker, however, not able to provide good  (OT A'd w/ pushing R side of walker during ambulation). Pt was left in chair w/ family at bedside to eat breakfast; OT educated pt not to ambulate without A from [facility] staff or therapy and pt verbalized understanding. Pt demo'd decreased safety awareness/impulsivity and decreased use of RUE impacting safety during ambulation, therefore, she would benefit from IRF to maximize safety/IND w/ functional mobility/ADLs. Will continue to progress. D/c recommendation: IRF      Other factors to consider for discharge: time since onset, severity of deficits          PLAN :  Patient continues to benefit from skilled intervention to address the above impairments. Continue treatment per established plan of care. to address goals.       Recommendation for discharge: (in order for the patient to meet his/her long term goals)  [address]     This discharge recommendation:  Has been made in collaboration with the attending provider and/or case management    IF patient discharges home will need the following DME: TBD next placement        SUBJECTIVE:   Patient stated I am ready to get up.     OBJECTIVE DATA SUMMARY:   Cognitive/Behavioral Status:  Neurologic State: Alert  Orientation Level: Oriented X4  Cognition: Follows commands        Safety/Judgement: Decreased awareness of need for safety    Functional Mobility and Transfers for ADLs:  Bed Mobility:  Supine to Sit: Minimum assistance    Transfers:  Sit to Stand: Minimum assistance; Moderate assistance     Bed to Chair: Minimum assistance; Moderate assistance; Additional time (cues for sequencing, walker placement, and safety)    Balance:  Sitting: Intact; Without support  Standing: Impaired; With support  Standing - Static: Fair;Constant support  Standing - Dynamic : Fair;Constant support    ADL Intervention:                      Upper Body Dressing Assistance  Pullover Shirt: Minimum assistance (using goldie-dressing technique)    Lower Body Dressing Assistance  Socks: Supervision (adjusted socks)  Position Performed: Seated edge of bed         Cognitive Retraining  Safety/Judgement: Decreased awareness of need for safety        Pain:  No reports of pain    Activity Tolerance:   Fair and requires rest breaks  Please refer to the flowsheet for vital signs taken during this treatment. After treatment patient left in no apparent distress:   Sitting in chair, Call bell within reach, and Caregiver / family present; Bed locked and set to lowest setting. COMMUNICATION/COLLABORATION:   The patients plan of care was discussed with: Registered nurse.      Diego Lowry OT  Time Calculation: 28 mins    Problem: Self Care Deficits Care Plan (Adult)  Goal: *Acute Goals and Plan of Care (Insert Text)  Description: Pt will be independence self feeding  Pt will be independence sup<->sit in prep for EOB ADL's  Pt will be independence  LB dressing EOB level  Pt will be independence  sit EOB 10 minutes in prep for EOB ADL's  Pt will be independence  sit<-> prep for toilet transfer  Pt will be independence  BSC/toilet transfer with LRAD  Pt will be independence  toileting/cloth mgmt LRAD  Pt will be independence  grooming standing sink  Pt will be independence bathing sitting/standing sink LRAD  Pt will be MI sulema UE HEP in prep for self care tasks    Outcome: Progressing Towards Goal

## 2022-08-29 NOTE — PROGRESS NOTES
SPEECH LANGUAGE PATHOLOGY EVALUATION  Patient: Monica Magallanes  (89 y.o. )  Date: 8/29/2022  Primary Diagnosis: CVA   Precautions:  fall    ASSESSMENT :  Based on the objective data described below, the patient presents with mild dysarthria c/b reduced articulatory precision, muscle weakness, fast rate of speech, and reduced intelligibility. Patient approx 90% intelligible w/ use of strategies: slow rate, increase volume and overarticulation she improves to 100% intelligibility. Receptive and expressive language is wfl. Introduced OME w/ handouts patient able to perform 15 reps per exercises: labial retraction and protrusion w/ 5 sec hold, lingual resistance w/ 3 second hold w/ min cues. Patient motivated during evaluation. PLAN :  Recommendations and Planned Interventions:  Rec cont regular/thin diet w/ strict asp/GERD precautions. Rec dysarthria tx. Frequency/Duration: Patient will be followed by speech-language pathology 2 times a week to address goals.   Discharge Recommendations: IRF      SUBJECTIVE:   Patient reports speech remains slightly slurred        OBJECTIVE:     Informal speech-language evaluation with various subtest administered    Receptive Language:  Receptive vocabulary    [x] WNL    [] Impaired [] Mild [] Mod [] Severe  Reliability of yes/no responses to questions [x] WNL    [] Impaired [] Mild [] Mod [] Severe  Simple yes/no questions   [x] WNL    [] Impaired [] Mild [] Mod [] Severe   Complex yes/no questions     [x] WNL    [] Impaired [] Mild [] Mod [] Severe   Reliability of responses to complex ideation [x] WNL    [] Impaired [] Mild [] Mod [] Severe  Auditory retention/processing   [x] WNL    [] Impaired [] Mild [] Mod [] Severe   Follow commands                                  [x] 1 Step [x] 2 Step [x] 3 Step [] complex  Body part ID      [x] WNL    [] Impaired [] Mild [] Mod [] Severe   Familiar object ID    [x] WNL    [] Impaired [] Mild [] Mod [] Severe   Simple paragraph comprehension  [x] WNL    [] Impaired [] Mild [] Mod [] Severe   Complex paragraph comprehension  [x] WNL    [] Impaired [] Mild [] Mod [] Severe       Expressive Language:  Verbalize basic orientation  [x] WNL    [] Impaired [] Mild [] Mod [] Severe   Automatic speech   [x] WNL    [] Impaired [] Mild [] Mod [] Severe  Able to identify objects/pictures         [x] WNL    [] Impaired [] Mild [] Mod [] Severe  Repetition     [x]words    [x]phrases    Responsive naming   [x] WNL    [] Impaired [] Mild [] Mod [] Severe   Complex WH questions  [x] WNL    [] Impaired [] Mild [] Mod [] Severe   Phrase completion   [x] WNL    [] Impaired [] Mild [] Mod [] Severe   Sentence formulation   [x] WNL    [] Impaired [] Mild [] Mod [] Severe     General Orientation:  Attention:  [x] Alert      Orientation:  x4  Attention to task: [x] Able to sustain    Speech:  Oral Verbal Apraxia: [x] None    [] Mild    [] Moderate    [] Severe   Dysarthria:  [] None    [x] Mild    [] Moderate    [] Severe   Intelligibility:  [] WNL    [] Words   [] Phrases   [] Sentences   [x] Conversation      % Intelligible:90        After treatment:   Patient left in no apparent distress in bed, Call bell within reach, Nursing notified, and Caregiver / family present    COMMUNICATION/EDUCATION:   Patient and family educated regarding speech strategies, OME, s/s aspiration and aspiration precautions. She demonstrated Good understanding as evidenced by engagement and appropriate questions. The patient's plan of care including recommendations, planned interventions, and recommended diet changes were discussed with: Registered nurse. Patient/family have participated as able in goal setting and plan of care. Patient/family agree to work toward stated goals and plan of care. Problem: Communication Impaired (Adult)  Goal: *Acute Goals and Plan of Care (Insert Text)  Description: -Patient will utilize speech strategies independently within 7 days. -Patient will perform OME w/ min cues within 7 days.    -Patient goal: to improve facial droop.    Outcome: Initial     Thank you for this referral.  Mariana Max M.Ed., M.S., CCC-SLP  Time Calculation: 15 mins

## 2022-08-29 NOTE — PROGRESS NOTES
Chart reviewed and CM was informed patient is medically stable but is in need of rehab. At this time patient does not have insurance and per [employer] does not qualify for medicaid. CM followed up with patient's sister, Radha Cerda, at the bedside [phone]. She was inquiring about getting the patient medicaid. CM explained patient was already screened by public benefits and was over income. Patient's sister reported there is no way she should be over income and wanted to see if [employer] would speak with them again. Patient okay with sister speaking with them. CM reached out to Tom Vee with [employer] and is going to speak with the sister. At this time therapy is recommending inpatient rehab as she is unsafe to return home. CM sent referral to [facility] and [facility] to inquire about renate beds. [facility] currently does not have a bed and CM is waiting to hear back from [address] [place] [address].

## 2022-08-29 NOTE — PROGRESS NOTES
Hospitalist Progress Note         Quinton Plant      Daily Progress Note: 8/29/2022      Subjective:   68-year-old female with a history of essential hypertension and chronic tobacco use admitted for sudden onset right-sided weakness secondary to acute left corona radiata infarction. Reports significant difficulties with right sided weakness with ambulatory and gait disturbance. Problem List:      Problem List as of 8/28/2022 Never Reviewed              Codes Class Noted - Resolved     CVA (cerebral vascular accident) [facility]) ICD-10-CM: I63.9  ICD-9-CM: 434.91   8/26/2022 - Present     Medications reviewed    Active Orders   Diet    ADULT DIET Regular; Low Sodium (2 gm)     Frequency: Effective Now     Number of Occurrences: Until Specified   Nursing    Assess skin per unit guidelines     Frequency: CONTINUOUS     Number of Occurrences: Until Specified    COMPLETE DYSPHAGIA SCREEN - PRIOR TO PROVIDING PATIENT ANY ORAL INTAKE     Frequency: ONE TIME     Number of Occurrences: 1 Occurrences     Order Comments: Prior to providing patient with any oral intake, complete the dysphagia screen. INTAKE AND OUTPUT PER UNIT ROUTINE     Frequency: CONTINUOUS     Number of Occurrences: Until Specified     Order Comments: Per unit routine      Maintain heels off of bed at all times     Frequency: CONTINUOUS     Number of Occurrences: Until Specified    MEASURE HEIGHT     Frequency: ONE TIME     Number of Occurrences: 1 Occurrences    NEURO/VASCULAR CHECKS PER UNIT ROUTINE     Frequency: CONTINUOUS     Number of Occurrences: Until Specified     Order Comments: Per unit routine.   Follow facility-based guidelines for Stroke / TIA      NIH STROKE SCALE ASSESSMENT(FIRST Neuro Assessment)     Frequency: ONE TIME     Number of Occurrences: 1 Occurrences    NOTIFY PROVIDER: SPECIFY ONE TIME STAT     Frequency: ONE TIME     Number of Occurrences: 1 Occurrences    NOTIFY PROVIDER: VITAL SIGNS CHANGES Frequency: PRN     Number of Occurrences: Until Specified     Order Comments: Notify provider for deterioration in neurologic status. NURSING ASSESSMENT:  SPECIFY Neuro Checks ONE TIME STAT     Frequency: ONE TIME     Number of Occurrences: 1 Occurrences    NURSING-MISCELLANEOUS: Initiate Stroke / TIA Monitoring per facility guidelines CONTINUOUS     Frequency: CONTINUOUS     Number of Occurrences: Until Specified    NURSING-MISCELLANEOUS: Maintain HOB at the lowest elevation consistent with medical plan of care CONTINUOUS     Frequency: CONTINUOUS     Number of Occurrences: Until Specified    NURSING-MISCELLANEOUS: Notify Charge Nurse for immediate rooming. ONE TIME     Frequency: ONE TIME     Number of Occurrences: 1 Occurrences    NURSING-MISCELLANEOUS: Pad/offload medical devices CONTINUOUS     Frequency: CONTINUOUS     Number of Occurrences: Until Specified    NURSING-MISCELLANEOUS: Provide patient with stroke education binder. ONE TIME     Frequency: ONE TIME     Number of Occurrences: 1 Occurrences    NURSING-MISCELLANEOUS: Use lift equipment for lifting patient CONTINUOUS     Frequency: CONTINUOUS     Number of Occurrences: Until Specified    POC GLUCOSE     Frequency: ONE TIME     Number of Occurrences: 1 Occurrences    Turn or assist with turn approximately every 2 hours if patient is unable to turn self. Remind patient to turn if necessary. Frequency: Q2H     Number of Occurrences: Until Specified    VITAL SIGNS (Per Protocol)     Frequency: ONE TIME     Number of Occurrences: 1 Occurrences    VITAL SIGNS PER UNIT ROUTINE - FOLLOW FACILITY-BASED GUIDELINES FOR STROKE / TIA     Frequency: CONTINUOUS     Number of Occurrences: Until Specified     Order Comments:  Follow facility-based guidelines for Stroke / TIA      WEIGH PATIENT     Frequency: ONE TIME     Number of Occurrences: 1 Occurrences   Code Status    FULL CODE     Frequency: CONTINUOUS     Number of Occurrences: Until Specified   Consult IP CONSULT TO NEUROLOGY     Frequency: ONE TIME     Number of Occurrences: 1 Occurrences   OT    OT EVAL AND TREAT     Frequency: ONE TIME     Number of Occurrences: 1 Occurrences   PT    PT EVAL AND TREAT     Frequency: ONE TIME     Number of Occurrences: 1 Occurrences   SLP    SLP EVAL AND TREAT     Frequency: ONE TIME     Number of Occurrences: 1 Occurrences   CT Charge    DYSPHAGIA SCREENING Prior to PO     Frequency: NOW     Number of Occurrences: 1 Occurrences   IV    SALINE LOCK IV ONE TIME STAT     Frequency: ONE TIME     Number of Occurrences: 1 Occurrences   Medications    acetaminophen (TYLENOL) solution 650 mg     Linked Order: Or     Frequency: Q4H PRN     Dose: 650 mg     Route: Per NG tube    acetaminophen (TYLENOL) suppository 650 mg     Linked Order: Or     Frequency: Q4H PRN     Dose: 650 mg     Route: Rectal    acetaminophen (TYLENOL) tablet 650 mg     Linked Order: Or     Frequency: Q4H PRN     Dose: 650 mg     Route: Oral    amLODIPine (NORVASC) tablet 10 mg     Frequency: DAILY     Dose: 10 mg     Route: Oral    aspirin chewable tablet 81 mg     Frequency: DAILY     Dose: 81 mg     Route: Oral    atorvastatin (LIPITOR) tablet 80 mg     Frequency: QHS     Dose: 80 mg     Route: Oral    enoxaparin (LOVENOX) injection 40 mg     Frequency: Q24H     Dose: 40 mg     Route: SubCUTAneous    hydrALAZINE (APRESOLINE) 20 mg/mL injection 10 mg     Frequency: Q6H PRN     Dose: 10 mg     Route: IntraVENous    lisinopriL (PRINIVIL, ZESTRIL) tablet 20 mg     Frequency: DAILY     Dose: 20 mg     Route: Oral    nicotine (NICODERM CQ) 14 mg/24 hr patch 1 Patch     Frequency: DAILY     Dose: 1 Patch     Route: TransDERmal          Review of Systems:   A comprehensive review of systems was negative except for that written in the HPI.     Objective:   Physical Exam:     Visit Vitals Patient Vitals for the past 24 hrs:   Temp Pulse Resp BP SpO2   08/29/22 0802 98.6 °F (37 °C) (!) 101 18 (!) 150/103 100 %   08/28/22 2322 97.8 °F (36.6 °C) 92 17 127/86 99 %   08/28/22 2008 98.4 °F (36.9 °C) (!) 103 20 (!) 130/92 99 %        O2 Device:     Physical Exam   General:  Alert, cooperative, no distress, appears stated age. Lungs:   Clear to auscultation bilaterally. Chest wall:  No tenderness or deformity. Heart:  Regular rate and rhythm, S1, S2 normal, no murmur, click, rub or gallop. Abdomen:   Soft, non-tender. Bowel sounds normal. No masses,  No organomegaly. Extremities: Extremities normal, atraumatic, no cyanosis or edema. Pulses: 2+ and symmetric all extremities. Skin: Skin color, texture, turgor normal. No rashes or lesions   Neurologic:  Facial droop on right side of face. 0/5 strength of right hand. 5/5 strength on lower extremities. Data Review:        Labs Reviewed   CBC WITH AUTOMATED DIFF - Abnormal; Notable for the following components:       Result Value    RDW 15.3 (*)     MPV 8.8 (*)     All other components within normal limits   METABOLIC PANEL, COMPREHENSIVE - Abnormal; Notable for the following components:    Glucose 124 (*)     BUN 5 (*)     BUN/Creatinine ratio 7 (*)     All other components within normal limits   LIPID PANEL - Abnormal; Notable for the following components:    Cholesterol, total 246 (*)     LDL, calculated 140.2 (*)     All other components within normal limits   GLUCOSE, POC - Abnormal; Notable for the following components:    Glucose (POC) 136 (*)     All other components within normal limits   PROTHROMBIN TIME + INR   PTT   TROPONIN-HIGH SENSITIVITY   HEMOGLOBIN A1C WITH EAG             Assessment     Acute CVA of left corona radiata     Essential hypertension    Ambulatory and gait disturbance    Plan:    Pt will require inpatient rehabilitation to regain strength from CVA. Pt medically stable for discharge to rehab once a bed is available. Counseled on the importance of smoking cessation. Continue treatment of essential hypertension.     Arthur Diaz

## 2022-08-30 PROCEDURE — 74011250636 HC RX REV CODE- 250/636: Performed by: INTERNAL MEDICINE

## 2022-08-30 PROCEDURE — 97110 THERAPEUTIC EXERCISES: CPT

## 2022-08-30 PROCEDURE — 97530 THERAPEUTIC ACTIVITIES: CPT

## 2022-08-30 PROCEDURE — 74011250637 HC RX REV CODE- 250/637: Performed by: INTERNAL MEDICINE

## 2022-08-30 PROCEDURE — 65270000029 HC RM PRIVATE

## 2022-08-30 PROCEDURE — 97116 GAIT TRAINING THERAPY: CPT

## 2022-08-30 RX ORDER — POLYETHYLENE GLYCOL 3350 17 G/17G
17 POWDER, FOR SOLUTION ORAL AS NEEDED
Status: DISCONTINUED | OUTPATIENT
Start: 2022-08-30 | End: 2022-09-08 | Stop reason: HOSPADM

## 2022-08-30 RX ORDER — DOCUSATE SODIUM 100 MG/1
200 CAPSULE, LIQUID FILLED ORAL DAILY
Status: DISCONTINUED | OUTPATIENT
Start: 2022-08-31 | End: 2022-09-08 | Stop reason: HOSPADM

## 2022-08-30 RX ORDER — DOCUSATE SODIUM 100 MG/1
100 CAPSULE, LIQUID FILLED ORAL DAILY
Status: DISCONTINUED | OUTPATIENT
Start: 2022-08-31 | End: 2022-08-30

## 2022-08-30 RX ADMIN — ENOXAPARIN SODIUM 40 MG: 100 INJECTION SUBCUTANEOUS at 09:26

## 2022-08-30 RX ADMIN — ATORVASTATIN CALCIUM 80 MG: 40 TABLET, FILM COATED ORAL at 21:46

## 2022-08-30 RX ADMIN — AMLODIPINE BESYLATE 10 MG: 5 TABLET ORAL at 09:26

## 2022-08-30 RX ADMIN — LISINOPRIL 20 MG: 20 TABLET ORAL at 09:28

## 2022-08-30 RX ADMIN — ASPIRIN 81 MG CHEWABLE TABLET 81 MG: 81 TABLET CHEWABLE at 09:26

## 2022-08-30 NOTE — PROGRESS NOTES
OCCUPATIONAL THERAPY TREATMENT  Patient: Shellie George (40 y.o. female)  Date: 8/30/2022  Diagnosis: CVA (cerebral vascular accident) [facility]) [I63.9] <principal problem not specified>      Precautions:    Chart, occupational therapy assessment, plan of care, and goals were reviewed. ASSESSMENT  Patient continues with skilled OT services and is progressing towards goals. Upon MONTIEL arrival, pt sitting in chair and agreeable to tx session. Pt noted with 3/4 to one finger size subluxation in R shoulder, sling donned to R side with MaxA. Pt completed sit>stand from chair with Hawa and ambulated in room to bathroom with Min/ModA requiring additional assist with maneuvering RW. Pt completed toilet transfer with CGA/Hawa and cueing for sequencing. Pt completed ambulation in hallway with Min/ModA in preparation for household mobility. Reviewed UE HEP and pt verbalized understanding. Pt left sitting in chair with call bell within reach and needs met. Will continue to follow pt throughout remainder of stay and progress towards OT goals. Recommending IRF at discharge when medically appropriate. Other factors to consider for discharge: family/social support, DME, time since onset, severity of deficits, decline from functional baseline         PLAN :  Patient continues to benefit from skilled intervention to address the above impairments. Continue treatment per established plan of care. to address goals. Recommendation for discharge: (in order for the patient to meet his/her long term goals)  [address]     This discharge recommendation:  Has been made in collaboration with the attending provider and/or case management    IF patient discharges home will need the following DME: TBD       SUBJECTIVE:   Patient stated I already went to the bathroom.     OBJECTIVE DATA SUMMARY:   Cognitive/Behavioral Status:  Neurologic State: Alert  Orientation Level: Oriented X4  Cognition: Follows commands  Safety/Judgement: Decreased insight into deficits    Functional Mobility and Transfers for ADLs:  Bed Mobility:  Rolling: Stand-by assistance; Additional time  Supine to Sit: Stand-by assistance; Additional time  Scooting: Stand-by assistance; Additional time    Transfers:  Sit to Stand: Minimum assistance  Functional Transfers  Bathroom Mobility: Minimum assistance  Toilet Transfer : Contact guard assistance;Minimum assistance  Bed to Chair: Minimum assistance    Balance:  Sitting: Impaired; With support  Sitting - Static: Good (unsupported)  Sitting - Dynamic: Fair (occasional)  Standing: Impaired; With support  Standing - Static: Constant support; Fair  Standing - Dynamic : Constant support; Fair    ADL Intervention:  Upper Body Dressing Assistance  Orthotics(Brace): Maximum assistance (RUE sling)    Cognitive Retraining  Safety/Judgement: Decreased insight into deficits    Therapeutic Exercises:   Exercise Sets Reps AROM AAROM PROM Self PROM Comments   Shoulder flex/ext 1 2 [] [] [] [x]      Pain:  0/10    Activity Tolerance:   Fair and requires rest breaks  Please refer to the flowsheet for vital signs taken during this treatment. After treatment patient left in no apparent distress:   Bed locked and in lowest position, Sitting in chair and Call bell within reach    COMMUNICATION/COLLABORATION:   The patients plan of care was discussed with: Physical therapy assistant and Certified nursing assistant/patient care technician.      TIANA Sánchez  Time Calculation: 29 mins    Problem: Self Care Deficits Care Plan (Adult)  Goal: *Acute Goals and Plan of Care (Insert Text)  Description: Pt will be IND self feeding  Pt will be IND sup<->sit in prep for EOB ADL's  Pt will be IND LB dressing EOB level  Pt will be IND sit EOB 10 minutes in prep for EOB ADL's  Pt will be IND sit<-> prep for toilet transfer  Pt will be IND BSC/toilet transfer with LRAD  Pt will be IND toileting/cloth mgmt LRAD  Pt will be IND grooming standing sink  Pt will be IND bathing sitting/standing sink LRAD  Pt will be [place] sulema UE HEP in prep for self care tasks    Outcome: Progressing Towards Goal

## 2022-08-30 NOTE — PROGRESS NOTES
PHYSICAL THERAPY TREATMENT  Patient: Claudio Ovalle (78 y.o. female)  Date: 8/30/2022  Diagnosis: CVA (cerebral vascular accident) [facility]) [I63.9] <principal problem not specified>      Precautions:    Chart, physical therapy assessment, plan of care and goals were reviewed. ASSESSMENT  Patient continues with skilled PT services and is progressing towards goals. Pt found supine in bed upon arrival and agreeable to therapy. Pt scooted and supine > sit SBA with additional time needed. Pt sit <> stand Min A with RW and ambulated Min A with RW and required Min/Mod A to assist RUE to the walker  and maintain holding . Pt ambulated approximately 122ft with an unsteady gait with LOB x2 with Mod A to recover balance. Pt noted to hyper extend R knee when ambulating and a few instances of R knee buckling. Pt walked further and had a more controlled speed compared to yesterday. Pt returned to room and sat EOB to perform therex, see chart below. Pt then ambulated around the bed to the high back chair. Pt left in high back chair with R arm propped on a pillow and call bell within reach and chair alarm on. Pt continues to demonstrate poor safety awareness, and is unsafe to ambulate independently at this time, requires assistance. Pt. Given sling by TIANA  for R UE due to 1 finger subluxation. Will try goldie walker next session. Current Level of Function Impacting Discharge (mobility/balance): SBA/ Min / Mod A for mobility    Other factors to consider for discharge: safety, decreased mobility, PLOF          PLAN :  Patient continues to benefit from skilled intervention to address the above impairments. Continue treatment per established plan of care. to address goals.     Recommendation for discharge: (in order for the patient to meet his/her long term goals)  [address]     This discharge recommendation:  Has been made in collaboration with the attending provider and/or case management    IF patient discharges home will need the following DME: to be determined (TBD), RW, wheelchair        SUBJECTIVE:   Patient stated I am feeling a slight bit of my fingers.     OBJECTIVE DATA SUMMARY:   Critical Behavior:  Neurologic State: Alert  Orientation Level: Oriented X4  Cognition: Impulsive, Follows commands  Safety/Judgement: Decreased insight into deficits  Functional Mobility Training:    Bed Mobility:  Rolling: Stand-by assistance; Additional time  Supine to Sit: Stand-by assistance; Additional time  Scooting: Stand-by assistance; Additional time    Transfers:  Sit to Stand: Minimum assistance  Stand to Sit: Minimum assistance  Bed to Chair: Minimum assistance    Balance:  Sitting: Intact; With support  Sitting - Static: Fair (occasional)  Sitting - Dynamic: Fair (occasional)  Standing: Impaired; With support  Standing - Static: Fair;Constant support  Standing - Dynamic : Fair;Constant support    Ambulation/Gait Training:  Distance (ft): 122 Feet (ft)  Assistive Device: Walker, rolling;Gait belt  Ambulation - Level of Assistance: Minimal assistance; Moderate assistance  Gait Abnormalities: Steppage gait  Base of Support: Narrowed      Therapeutic Exercises:       EXERCISE   Sets   Reps   Active Active Assist   Passive Self ROM   Comments   Ankle Pumps  15 [x] [] [] []    Hip abd/add  15 [x] [] [] []    Long Arc Quads  15 [x] [] [] []    Marching  15 [x] [] [] []       Pain Ratin/10    Activity Tolerance:   Fair  Please refer to the flowsheet for vital signs taken during this treatment. After treatment patient left in no apparent distress:   Bed returned to lowest position, Sitting in chair, Call bell within reach, and Bed / chair alarm activated    COMMUNICATION/COLLABORATION:   The patients plan of care was discussed with: Registered nurse.      HOLDEN Horne   Time Calculation: 39 mins    KASANDRA Horne, under direct supervision of Tayler Marie PTA provided care and treatment of pt with verbal consent from pt received. Problem: Mobility Impaired (Adult and Pediatric)  Goal: *Acute Goals and Plan of Care (Insert Text)  Description: Patient stated goal: feel better  Patient will move from supine to sit and sit to supine , scoot up and down, and roll side to side in bed with supervision/set-up within 7 day(s). Patient will transfer from bed to chair and chair to bed with minimal assistance/contact guard assist using the least restrictive device within 7 day(s). Patient will improve static standing balance to supervision within 1 week(s). Patient will ambulate 40 feet with minimal assistance with least restrictive device within 1 weeks.      Outcome: Progressing Towards Goal

## 2022-08-30 NOTE — PROGRESS NOTES
Public Benefits followed up with the patient and her sister and indicated she does qualify for medicaid. They submitted the application however this will not become active, if approved, for 30 to 45 days. CM followed up with the patient at the bedside and she is willing to complete the renate application for [facility]. CM assisted her as she cannot write with her right hand at this time. She will need to get a copy of her bank statements which is working on at this time. Once obtained CM will send to [address] [place] [address]. Patient is unsafe to discharge home at this time and therapy is recommending IRF. Due to no insurance she will not be able to get SNF or HH.

## 2022-08-30 NOTE — PROGRESS NOTES
Hospitalist Progress Note         Aleyda Holbrook MD          Daily Progress Note: 8/30/2022      Subjective: The patient is seen for follow  up.  64-year-old with history of essential hypertension and chronic tobacco use admitted for sudden onset right-sided weakness. Admitted for acute CVA work-up. MRI brain showed acute left corona radiata infarction. Unfortunately, patient does not have any health insurance which is making rehab referral problematic  ---  She is seen in follow-up. Patient has continued symptoms of right-sided weakness. Right arm is flaccid, right leg has some movement and she is able to walk with a walker    Problem List:  Problem List as of 8/30/2022 Never Reviewed            Codes Class Noted - Resolved    CVA (cerebral vascular accident) [facility]) ICD-10-CM: I63.9  ICD-9-CM: 434.91  8/26/2022 - Present       Medications reviewed  Current Facility-Administered Medications   Medication Dose Route Frequency    atorvastatin (LIPITOR) tablet 80 mg  80 mg Oral QHS    lisinopriL (PRINIVIL, ZESTRIL) tablet 20 mg  20 mg Oral DAILY    acetaminophen (TYLENOL) tablet 650 mg  650 mg Oral Q4H PRN    Or    acetaminophen (TYLENOL) solution 650 mg  650 mg Per NG tube Q4H PRN    Or    acetaminophen (TYLENOL) suppository 650 mg  650 mg Rectal Q4H PRN    aspirin chewable tablet 81 mg  81 mg Oral DAILY    enoxaparin (LOVENOX) injection 40 mg  40 mg SubCUTAneous Q24H    amLODIPine (NORVASC) tablet 10 mg  10 mg Oral DAILY    hydrALAZINE (APRESOLINE) 20 mg/mL injection 10 mg  10 mg IntraVENous Q6H PRN    nicotine (NICODERM CQ) 14 mg/24 hr patch 1 Patch  1 Patch TransDERmal DAILY       Review of Systems:   A comprehensive review of systems was negative except for that written in the HPI.     Objective:   Physical Exam:     Visit Vitals  /85 (BP 1 Location: Left upper arm, BP Patient Position: At rest)   Pulse 73   Temp 97.6 °F (36.4 °C)   Resp 16   Ht 5' 6\" (1.676 m)   Wt 73.9 kg (163 lb)   SpO2 97%   BMI 26.31 kg/m²      O2 Device: None (Room air)    Temp (24hrs), Av °F (36.7 °C), Min:97.6 °F (36.4 °C), Max:98.5 °F (36.9 °C)     07 -  1900  In: 480 [P.O.:480]  Out: -    No intake/output data recorded. General:  Alert, cooperative, no distress, appears stated age. Lungs:   Clear to auscultation bilaterally. Chest wall:  No tenderness or deformity. Heart:  Regular rate and rhythm, S1, S2 normal, no murmur, click, rub or gallop. Abdomen:   Soft, non-tender. Bowel sounds normal. No masses,  No organomegaly. Extremities: Extremities normal, atraumatic, no cyanosis or edema. Pulses: 2+ and symmetric all extremities. Skin: Skin color, texture, turgor normal. No rashes or lesions   Neurologic: CNII-XII intact. RUE . Data Review:       Recent Days:  No results for input(s): WBC, HGB, HCT, PLT, HGBEXT, HCTEXT, PLTEXT, HGBEXT, HCTEXT, PLTEXT in the last 72 hours. No results for input(s): NA, K, CL, CO2, GLU, BUN, CREA, CA, MG, PHOS, ALB, TBIL, TBILI, ALT, INR, INREXT, INREXT in the last 72 hours. No lab exists for component: SGOT    No results for input(s): PH, PCO2, PO2, HCO3, FIO2 in the last 72 hours. 24 Hour Results:  No results found for this or any previous visit (from the past 24 hour(s)).           Assessment/     Acute ischemic stroke left corona radiata with right hemiplegia, arm worse than leg    Uncontrolled hypertension    -Continue amlodipine 10 mg oral daily    -Increase lisinopril to 20 mg oral daily    -Hydralazine 10 mg IV every 4 hours as needed for systolic blood pressure over 150    Ambulatory and gait disturbance    -Due to acute CVA    -Will   need inpatient rehabilitation    Tobacco abuse    Hyperlipidemia      Plan:  Continue high intensity statin and aspirin  Continue antihypertensives  Case management working on rehab options (ie [facility] bed)    Care Plan discussed with: Patient/Family    Total time spent with patient: 30 minutes.     Tona Kussmaul, MD

## 2022-08-31 PROCEDURE — 74011250637 HC RX REV CODE- 250/637: Performed by: INTERNAL MEDICINE

## 2022-08-31 PROCEDURE — 97116 GAIT TRAINING THERAPY: CPT

## 2022-08-31 PROCEDURE — 65270000029 HC RM PRIVATE

## 2022-08-31 PROCEDURE — 97110 THERAPEUTIC EXERCISES: CPT

## 2022-08-31 PROCEDURE — 97530 THERAPEUTIC ACTIVITIES: CPT

## 2022-08-31 PROCEDURE — 92507 TX SP LANG VOICE COMM INDIV: CPT

## 2022-08-31 PROCEDURE — 74011250636 HC RX REV CODE- 250/636: Performed by: INTERNAL MEDICINE

## 2022-08-31 RX ADMIN — AMLODIPINE BESYLATE 10 MG: 5 TABLET ORAL at 09:51

## 2022-08-31 RX ADMIN — ENOXAPARIN SODIUM 40 MG: 100 INJECTION SUBCUTANEOUS at 09:52

## 2022-08-31 RX ADMIN — LISINOPRIL 20 MG: 20 TABLET ORAL at 09:51

## 2022-08-31 RX ADMIN — ASPIRIN 81 MG CHEWABLE TABLET 81 MG: 81 TABLET CHEWABLE at 09:51

## 2022-08-31 RX ADMIN — ATORVASTATIN CALCIUM 80 MG: 40 TABLET, FILM COATED ORAL at 20:59

## 2022-08-31 RX ADMIN — DOCUSATE SODIUM 200 MG: 100 CAPSULE, LIQUID FILLED ORAL at 09:51

## 2022-08-31 RX ADMIN — POLYETHYLENE GLYCOL 3350 17 G: 17 POWDER, FOR SOLUTION ORAL at 09:51

## 2022-08-31 NOTE — PROGRESS NOTES
Problem: Self Care Deficits Care Plan (Adult)  Goal: *Acute Goals and Plan of Care (Insert Text)  Description: Pt will be IND self feeding  Pt will be IND sup<->sit in prep for EOB ADL's  Pt will be IND LB dressing EOB level  Pt will be IND sit EOB 10 minutes in prep for EOB ADL's  Pt will be IND sit<-> prep for toilet transfer  Pt will be IND BSC/toilet transfer with LRAD  Pt will be IND toileting/cloth mgmt LRAD  Pt will be IND grooming standing sink  Pt will be IND bathing sitting/standing sink LRAD  Pt will be MI sulema UE HEP in prep for self care tasks    Outcome: Progressing Towards Goal   OCCUPATIONAL THERAPY TREATMENT  Patient: Alexsander Bermudez (52 y.o. female)  Date: 8/31/2022  Diagnosis: CVA (cerebral vascular accident) ([facility] [address] [I63.9] <principal problem not specified>      Precautions: FALL  Chart, occupational therapy assessment, plan of care, and goals were reviewed. ASSESSMENT  Patient continues with skilled OT services and is progressing towards goals. Patient received seated in chair upon MONTIEL'S arrival and agreeable to work with therapist. Patient required set-up for LB dressing (sulema socks) using L UE only, chair level. STS using goldie walker/gait belt with CGA and chair<>sink tf with min A to CGA for balance with vc's for correct hand/feet placement and RW/mgmt for safety. Standing at sink, pt req'd CGA for simple grooming (washed hands) with no LOB balance noted. Pt returned to chair, min A for correct positioning of R sling for comfort and support. Pt educated on neuroplasticity and the importance of repetitive movements to form new pathways to elicit movement. Patient educated on and completed bilateral SROM (using towel) to increase strength/movement needed for ADL'S and functional transfers, see grid below. Per pt's request, therapist provided ice and water. Pt left resting in chair with call bell, chair alarm and all needs met.  Pt continues to present flaccidy in R UE with 1 finger subluxation with no pain with movement. Patient would benefit from continued skilled Occupational services while at [facility] in order to increase safety and independence with self care and functional tranfers/mobility. Recommend at discharge to IRF when medically appropriate. Current Level of Function Impacting Discharge (ADLs): CGA simple grooming/set-up LB dressing    Other factors to consider for discharge: Time of onset, medical prognosis/diagnosis, severity of deficits, PLOF, functional baseline, home environment, and family support          PLAN :  Patient continues to benefit from skilled intervention to address the above impairments. Continue treatment per established plan of care. to address goals. Recommend with staff: chair level grooming    Recommend next OT session: UB dressing/toileting    Recommendation for discharge: (in order for the patient to meet his/her long term goals)  [address] [facility]     This discharge recommendation:  Has been made in collaboration with the attending provider and/or case management    IF patient discharges home will need the following DME: TBD       SUBJECTIVE:   Patient stated I have no pain at all.     OBJECTIVE DATA SUMMARY:   Cognitive/Behavioral Status:  Neurologic State: Alert  Orientation Level: Oriented X4  Cognition: Follows commands             Functional Mobility and Transfers for ADLs:  Bed Mobility:       Transfers:  Sit to Stand: Contact guard assistance  Functional Transfers  Bathroom Mobility: Minimum assistance;Contact guard assistance       Balance:  Sitting: Intact  Sitting - Static: Good (unsupported)  Sitting - Dynamic: Good (unsupported)  Standing: Impaired; With support  Standing - Static: Constant support; Fair  Standing - Dynamic : Constant support; Fair    ADL Intervention:       Grooming  Grooming Assistance: Contact guard assistance  Position Performed: Standing  Washing Hands: Contact guard assistance         Lower Body Dressing Assistance  Dressing Assistance: Set-up  Socks: Set-up  Leg Crossed Method Used: Yes  Position Performed: Seated in chair       Therapeutic Exercises:   Exercise Sets Reps AROM AAROM PROM Self PROM Comments   Shoulder flex/ext 3 15 [] [] [] [x] Seated, using towel across bedside table, vc's for technique and posture   Elbow flex/ext 3 15 [] [] [] [x]         Pain:  0/10    Activity Tolerance:   Fair  Please refer to the flowsheet for vital signs taken during this treatment. After treatment patient left in no apparent distress:   Bed locked and in lowest position, Sitting in chair and Bed / chair alarm activated    COMMUNICATION/COLLABORATION:   The patients plan of care was discussed with: Registered nurse.      TIANA Bolanos  Time Calculation: 26 mins

## 2022-08-31 NOTE — PROGRESS NOTES
Problem: Mobility Impaired (Adult and Pediatric)  Goal: *Acute Goals and Plan of Care (Insert Text)  Description: Patient stated goal: feel better  Patient will move from supine to sit and sit to supine , scoot up and down, and roll side to side in bed with supervision/set-up within 7 day(s). Patient will transfer from bed to chair and chair to bed with minimal assistance/contact guard assist using the least restrictive device within 7 day(s). Patient will improve static standing balance to supervision within 1 week(s). Patient will ambulate 40 feet with minimal assistance with least restrictive device within 1 weeks. Outcome: Progressing Towards Goal   PHYSICAL THERAPY TREATMENT  Patient: Ginny De La Garza (88 y.o. female)  Date: 8/31/2022  Diagnosis: [facility]cere[facility]) [facility] [I63.9] <principal problem not specified>      Precautions:    Chart, physical therapy assessment, plan of care and goals were reviewed. ASSESSMENT  Patient continues with skilled PT services and is progressing towards goals. Pt. Semi supine upon arrival and agreed to work with therapy. Pt. With R UE in sling. Supine to sit with SBA and additional time. Sit to stand with Min A x 1. Instructed pt with using goldie walker. Pt. required Mod A X 1 initially due to using new AD. Pt. ambulated  135' with goldie walker and Min A X 1 for balance  and vcs for safety. Pt. Required vcs for keeping walker closer to body and proper foot placement. Pt. Continues to demonstrate hyperextension of  R knee, decreased step/stride length. Pt. Also remains flaccid with R UE. Current Level of Function Impacting Discharge (mobility/balance): decreased mobility/lack of insight to deficits    Other factors to consider for discharge: safety/ needs assistance at home/Not safe to ambulate alone         PLAN :  Patient continues to benefit from skilled intervention to address the above impairments.   Continue treatment per established plan of care.  to address goals. Recommendation for discharge: (in order for the patient to meet his/her long term goals)  [address]     This discharge recommendation:  Has been made in collaboration with the attending provider and/or case management    IF patient discharges home will need the following DME: wheelchair and Jose walker       SUBJECTIVE:   Patient stated I'm doing good. \" Family visiting during session. \"I want to go to rehab. \"    OBJECTIVE DATA SUMMARY:   Critical Behavior:  Neurologic State: Alert  Orientation Level: Oriented X4  Cognition: Follows commands  Safety/Judgement: Decreased insight into deficits  Functional Mobility Training:  Bed Mobility:  Rolling: Stand-by assistance; Additional time  Supine to Sit: Stand-by assistance; Additional time     Scooting: Stand-by assistance        Transfers:  Sit to Stand: Minimum assistance  Stand to Sit: Minimum assistance        Bed to Chair: Minimum assistance        Worked on static standing balance with jose walker and practiced/instructed in proper foot /walker placement            Balance:  Sitting: Intact  Sitting - Static: Good (unsupported)  Sitting - Dynamic: Fair (occasional)  Standing: Impaired; With support  Standing - Static: Constant support; Fair  Standing - Dynamic : Constant support; Fair  Ambulation/Gait Training:  Distance (ft): 135 Feet (ft)  Assistive Device: Walker jose  Ambulation - Level of Assistance: Minimal assistance        Gait Abnormalities: Steppage gait; Step to gait        Base of Support: Narrowed            Therapeutic Exercises: Therapeutic Exercises:       EXERCISE   Sets   Reps   Active Active Assist   Passive Self ROM   Comments   Ankle Pumps  15 [x] [] [] []    Quad Sets/Glut Sets   [] [] [] []    Hamstring Sets   [] [] [] []    Short Arc Quads   [] [] [] []    Heel Slides   [] [] [] []    Straight Leg Raises   [] [] [] []    Hip abd/add  15 [x] [x] [] [] Assist with R LE   Long Arc Quads  15 [x] [x] [] [] Assist with R LE   Marching  15 [x] [x] [] [] Assist with R LE   Hip adduction(pillow squeezes)  15 [x] [] [] []     Pt. Was given resistance for L LE exercises. Reviewed with pt.  self ROM with R UE. Pain Ratin/10      Activity Tolerance:   Bed locked and in lowest position Fair and requires rest breaks  Please refer to the flowsheet for vital signs taken during this treatment. After treatment patient left in no apparent distress:   Bed returned to lowest position, Sitting in chair, Call bell within reach, Bed / chair alarm activated, and Notified RN of RX.    COMMUNICATION/COLLABORATION:   The patients plan of care was discussed with: Registered nurse.      Cassy Hanson   Time Calculation: 41 mins

## 2022-08-31 NOTE — PROGRESS NOTES
Problem: Falls - Risk of  Goal: *Absence of Falls  Description: Document Temitope Ariel Fall Risk and appropriate interventions in the flowsheet.   Outcome: Progressing Towards Goal  Note: Fall Risk Interventions:  Mobility Interventions: Bed/chair exit alarm, Patient to call before getting OOB         Medication Interventions: Bed/chair exit alarm, Patient to call before getting OOB, Teach patient to arise slowly    Elimination Interventions: Call light in reach, Bed/chair exit alarm, Patient to call for help with toileting needs              Problem: TIA/CVA Stroke: 0-24 hours  Goal: Off Pathway (Use only if patient is Off Pathway)  Outcome: Progressing Towards Goal  Goal: Activity/Safety  Outcome: Progressing Towards Goal  Goal: Consults, if ordered  Outcome: Progressing Towards Goal  Goal: Diagnostic Test/Procedures  Outcome: Progressing Towards Goal  Goal: Nutrition/Diet  Outcome: Progressing Towards Goal  Goal: Discharge Planning  Outcome: Progressing Towards Goal  Goal: Medications  Outcome: Progressing Towards Goal  Goal: Respiratory  Outcome: Progressing Towards Goal  Goal: Treatments/Interventions/Procedures  Outcome: Progressing Towards Goal  Goal: Minimize risk of bleeding post-thrombolytic infusion  Outcome: Progressing Towards Goal  Goal: Monitor for complications post-thrombolytic infusion  Outcome: Progressing Towards Goal  Goal: Psychosocial  Outcome: Progressing Towards Goal  Goal: *Hemodynamically stable  Outcome: Progressing Towards Goal  Goal: *Neurologically stable  Description: Absence of additional neurological deficits    Outcome: Progressing Towards Goal  Goal: *Verbalizes anxiety and depression are reduced or absent  Outcome: Progressing Towards Goal  Goal: *Absence of Signs of Aspiration on Current Diet  Outcome: Progressing Towards Goal  Goal: *Absence of deep venous thrombosis signs and symptoms(Stroke Metric)  Outcome: Progressing Towards Goal  Goal: *Ability to perform ADLs and demonstrates progressive mobility and function  Outcome: Progressing Towards Goal  Goal: *Stroke education started(Stroke Metric)  Outcome: Progressing Towards Goal  Goal: *Dysphagia screen performed(Stroke Metric)  Outcome: Progressing Towards Goal  Goal: *Rehab consulted(Stroke Metric)  Outcome: Progressing Towards Goal     Problem: TIA/CVA Stroke: Day 2 Until Discharge  Goal: Off Pathway (Use only if patient is Off Pathway)  Outcome: Progressing Towards Goal  Goal: Activity/Safety  Outcome: Progressing Towards Goal  Goal: Diagnostic Test/Procedures  Outcome: Progressing Towards Goal  Goal: Nutrition/Diet  Outcome: Progressing Towards Goal  Goal: Discharge Planning  Outcome: Progressing Towards Goal  Goal: Medications  Outcome: Progressing Towards Goal  Goal: Respiratory  Outcome: Progressing Towards Goal  Goal: Treatments/Interventions/Procedures  Outcome: Progressing Towards Goal  Goal: Psychosocial  Outcome: Progressing Towards Goal  Goal: *Verbalizes anxiety and depression are reduced or absent  Outcome: Progressing Towards Goal  Goal: *Absence of aspiration  Outcome: Progressing Towards Goal  Goal: *Absence of deep venous thrombosis signs and symptoms(Stroke Metric)  Outcome: Progressing Towards Goal  Goal: *Optimal pain control at patient's stated goal  Outcome: Progressing Towards Goal  Goal: *Tolerating diet  Outcome: Progressing Towards Goal  Goal: *Ability to perform ADLs and demonstrates progressive mobility and function  Outcome: Progressing Towards Goal  Goal: *Stroke education continued(Stroke Metric)  Outcome: Progressing Towards Goal     Problem: Ischemic Stroke: Discharge Outcomes  Goal: *Verbalizes anxiety and depression are reduced or absent  Outcome: Progressing Towards Goal  Goal: *Verbalize understanding of risk factor modification(Stroke Metric)  Outcome: Progressing Towards Goal  Goal: *Hemodynamically stable  Outcome: Progressing Towards Goal  Goal: *Absence of aspiration pneumonia  Outcome: Progressing Towards Goal  Goal: *Aware of needed dietary changes  Outcome: Progressing Towards Goal  Goal: *Verbalize understanding of prescribed medications including anti-coagulants, anti-lipid, and/or anti-platelets(Stroke Metric)  Outcome: Progressing Towards Goal  Goal: *Tolerating diet  Outcome: Progressing Towards Goal  Goal: *Aware of follow-up diagnostics related to anticoagulants  Outcome: Progressing Towards Goal  Goal: *Ability to perform ADLs and demonstrates progressive mobility and function  Outcome: Progressing Towards Goal  Goal: *Absence of DVT(Stroke Metric)  Outcome: Progressing Towards Goal  Goal: *Absence of aspiration  Outcome: Progressing Towards Goal  Goal: *Optimal pain control at patient's stated goal  Outcome: Progressing Towards Goal  Goal: *Home safety concerns addressed  Outcome: Progressing Towards Goal  Goal: *Describes available resources and support systems  Outcome: Progressing Towards Goal  Goal: *Verbalizes understanding of activation of EMS(911) for stroke symptoms(Stroke Metric)  Outcome: Progressing Towards Goal  Goal: *Understands and describes signs and symptoms to report to providers(Stroke Metric)  Outcome: Progressing Towards Goal  Goal: *Neurolgocially stable (absence of additional neurological deficits)  Outcome: Progressing Towards Goal  Goal: *Verbalizes importance of follow-up with primary care physician(Stroke Metric)  Outcome: Progressing Towards Goal  Goal: *Smoking cessation discussed,if applicable(Stroke Metric)  Outcome: Progressing Towards Goal  Goal: *Depression screening completed(Stroke Metric)  Outcome: Progressing Towards Goal     Problem: Pressure Injury - Risk of  Goal: *Prevention of pressure injury  Description: Document Jose Antonio Scale and appropriate interventions in the flowsheet.   Outcome: Progressing Towards Goal  Note: Pressure Injury Interventions:  Sensory Interventions: Assess changes in LOC, Maintain/enhance activity level    Moisture Interventions: Absorbent underpads    Activity Interventions: Increase time out of bed, PT/OT evaluation    Mobility Interventions: PT/OT evaluation    Nutrition Interventions: Document food/fluid/supplement intake    Friction and Shear Interventions: HOB 30 degrees or less

## 2022-08-31 NOTE — PROGRESS NOTES
Hospitalist Progress Note         Hamzah Vogel MD          Daily Progress Note: 8/31/2022      Subjective: The patient is seen for follow  up.  61-year-old with history of essential hypertension and chronic tobacco use admitted for sudden onset right-sided weakness. Admitted for acute CVA work-up. MRI brain showed acute left corona radiata infarction. Unfortunately, patient does not have any health insurance which is making rehab referral problematic  ---  She is seen in follow-up. Patient has continued symptoms of right-sided weakness. We are awaiting evaluation by [facility] for possible renate bed    Problem List:  Problem List as of 8/31/2022 Never Reviewed            Codes Class Noted - Resolved    CVA (cerebral vascular accident) [facility]) ICD-10-CM: I63.9  ICD-9-CM: 434.91  8/26/2022 - Present       Medications reviewed  Current Facility-Administered Medications   Medication Dose Route Frequency    docusate sodium (COLACE) capsule 200 mg  200 mg Oral DAILY    polyethylene glycol (MIRALAX) packet 17 g  17 g Oral PRN    atorvastatin (LIPITOR) tablet 80 mg  80 mg Oral QHS    lisinopriL (PRINIVIL, ZESTRIL) tablet 20 mg  20 mg Oral DAILY    acetaminophen (TYLENOL) tablet 650 mg  650 mg Oral Q4H PRN    Or    acetaminophen (TYLENOL) solution 650 mg  650 mg Per NG tube Q4H PRN    Or    acetaminophen (TYLENOL) suppository 650 mg  650 mg Rectal Q4H PRN    aspirin chewable tablet 81 mg  81 mg Oral DAILY    enoxaparin (LOVENOX) injection 40 mg  40 mg SubCUTAneous Q24H    amLODIPine (NORVASC) tablet 10 mg  10 mg Oral DAILY    hydrALAZINE (APRESOLINE) 20 mg/mL injection 10 mg  10 mg IntraVENous Q6H PRN    nicotine (NICODERM CQ) 14 mg/24 hr patch 1 Patch  1 Patch TransDERmal DAILY       Review of Systems:   A comprehensive review of systems was negative except for that written in the HPI.     Objective:   Physical Exam:     Visit Vitals  /80 (BP 1 Location: Left upper arm)   Pulse 82   Temp 98.3 °F (36.8 °C)   Resp 20   Ht 5' 6\" (1.676 m)   Wt 73.9 kg (163 lb)   SpO2 100%   BMI 26.31 kg/m²      O2 Device: None (Room air)    Temp (24hrs), Av.1 °F (36.7 °C), Min:98 °F (36.7 °C), Max:98.3 °F (36.8 °C)    No intake/output data recorded.  1901 -  0700  In: 480 [P.O.:480]  Out: -     General:  Alert, cooperative, no distress, appears stated age. Lungs:   Clear to auscultation bilaterally. Chest wall:  No tenderness or deformity. Heart:  Regular rate and rhythm, S1, S2 normal, no murmur, click, rub or gallop. Abdomen:   Soft, non-tender. Bowel sounds normal. No masses,  No organomegaly. Extremities: Extremities normal, atraumatic, no cyanosis or edema. Pulses: 2+ and symmetric all extremities. Skin: Skin color, texture, turgor normal. No rashes or lesions   Neurologic: CNII-XII intact. RUE . Data Review:       Recent Days:  No results for input(s): WBC, HGB, HCT, PLT, HGBEXT, HCTEXT, PLTEXT, HGBEXT, HCTEXT, PLTEXT in the last 72 hours. No results for input(s): NA, K, CL, CO2, GLU, BUN, CREA, CA, MG, PHOS, ALB, TBIL, TBILI, ALT, INR, INREXT, INREXT in the last 72 hours. No lab exists for component: SGOT    No results for input(s): PH, PCO2, PO2, HCO3, FIO2 in the last 72 hours. 24 Hour Results:  No results found for this or any previous visit (from the past 24 hour(s)).           Assessment/     Acute ischemic stroke left corona radiata with right hemiplegia, arm worse than leg    Uncontrolled hypertension    -Continue amlodipine 10 mg oral daily    -Increase lisinopril to 20 mg oral daily    -Hydralazine 10 mg IV every 4 hours as needed for systolic blood pressure over 150    Ambulatory and gait disturbance    -Due to acute CVA    -Will   need inpatient rehabilitation    Tobacco abuse    Hyperlipidemia      Plan:  Continue high intensity statin and aspirin  Continue antihypertensives  Case management working on rehab options (ie renate bed)    Care Plan discussed with: Patient/Family    Total time spent with patient: 30 minutes.     Denzel Juarez MD

## 2022-08-31 NOTE — PROGRESS NOTES
SPEECH LANGUAGE PATHOLOGY DYSPHAGIA AND SPEECH TREATMENT  Patient: Thea Delvalle (19 y.o. female)  Date: 8/31/2022  Diagnosis: CVA (cerebral vascular accident) [facility]) [I63.9]     Precautions: fall    ASSESSMENT :  Patient continues w/ mild dysarthria c/b reduced articulatory precision, muscle weakness, fast rate of speech, and reduced intelligibility. Remains approximately 90% intelligible and with strategies improves to 100%. Min cues for strategies during conversation. She is independent at phrase and sentence level. Patient recalled all OME and provided handout stating she is completing 15 reps 2x/day. Patient able to perform 15 reps per exercises: labial retraction and protrusion w/ 5 sec hold, lingual resistance w/ 3 second hold w/ min cues. Patient motivated during session. PLAN :  Recommendations and Planned Interventions:  Rec cont regular/thin diet w/ strict asp/GERD precautions. Rec dysarthria tx. Frequency/Duration: Patient will be followed by speech-language pathology 2 times a week to address goals. Discharge Recommendations: IRF      SUBJECTIVE:   Patient reports she knows she is supposed to get help doing things but it is hard not to try by herself because she has always been independent. OBJECTIVE:   Cognitive and Communication Status:  Neurologic State: Alert  Orientation Level: Oriented X4  Cognition: Follows commands  Perception: Appears intact  Perseveration: No perseveration noted  Safety/Judgement: Decreased insight into deficits        Pain:  Pain Scale 1: Numeric (0 - 10)  Pain Intensity 1: 0       After treatment:   Patient left in no apparent distress in bed, Call bell within reach, and Nursing notified    COMMUNICATION/EDUCATION:   Patient was educated regarding risk factors for CVA, speech strategies, BEFAST, OME, and ST POC. She demonstrated Good understanding as evidenced by engagement, demonstration and recall.     The patient's plan of care including recommendations and planned interventions were discussed with: Registered nurse. Problem: Communication Impaired (Adult)  Goal: *Acute Goals and Plan of Care (Insert Text)  Description: -Patient will utilize speech strategies independently within 7 days.     -Patient will perform OME w/ min cues within 7 days.    -Patient goal: to improve facial droop.    Outcome: Progressing Towards Goal     Thank you for this referral.  Irineo Reyez M.S., M.Ed., CCC-SLP  Time Calculation: 14 mins

## 2022-08-31 NOTE — PROGRESS NOTES
CM followed up with the patient at the bedside and she provided documents requested for her renate application to [address] [place] [address]. CM has faxed all documents with the renate application to [address] [place] [address] and awaiting response at this time.

## 2022-08-31 NOTE — PROGRESS NOTES
Spiritual Care Assessment/Progress Note  [facility]      NAME: Delfina Lynne      MRN: [id number]  AGE: 59 y.o. SEX: female  Scientologist Affiliation: No preference   Language: English     8/31/2022     Total Time (in minutes): 10     Spiritual Assessment begun in [facility] through conversation with:         [x]Patient        [] Family    [] Friend(s)        Reason for Consult: Initial visit     Spiritual beliefs: (Please include comment if needed)     [] Identifies with a dorota tradition:         [] Supported by a dorota community:            [] Claims no spiritual orientation:           [] Seeking spiritual identity:                [] Adheres to an individual form of spirituality:           [x] Not able to assess:                           Identified resources for coping:      [] Prayer                               [] Music                  [] Guided Imagery     [] Family/friends                 [] Pet visits     [] Devotional reading                         [x] Unknown     [] Other:                                            Interventions offered during this visit: (See comments for more details)    Patient Interventions: Initial visit, Other (comment) (Silent support)           Plan of Care:     [] Support spiritual and/or cultural needs    [] Support AMD and/or advance care planning process      [] Support grieving process   [] Coordinate Rites and/or Rituals    [] Coordination with community clergy   [] No spiritual needs identified at this time   [] Detailed Plan of Care below (See Comments)  [] Make referral to Music Therapy  [] Make referral to Pet Therapy     [] Make referral to Addiction services  [] Make referral to [facility]  [] Make referral to Spiritual Care Partner  [] No future visits requested        [x] Contact Spiritual Care for further referrals     Comments:  Visited patient in [address] [place] [address] for initial assessment.   Patient appeared to be sleeping and did not respond to greeting. No visitors were present. Provided support of presence. Contact chaplains for further referrals. Chaplain Cuco Ventura M.Div.    can be reached by calling the  at [facility]  [phone]

## 2022-09-01 PROCEDURE — 74011250636 HC RX REV CODE- 250/636: Performed by: INTERNAL MEDICINE

## 2022-09-01 PROCEDURE — 74011250637 HC RX REV CODE- 250/637: Performed by: INTERNAL MEDICINE

## 2022-09-01 PROCEDURE — 97530 THERAPEUTIC ACTIVITIES: CPT

## 2022-09-01 PROCEDURE — 97116 GAIT TRAINING THERAPY: CPT

## 2022-09-01 PROCEDURE — 65270000029 HC RM PRIVATE

## 2022-09-01 RX ADMIN — DOCUSATE SODIUM 200 MG: 100 CAPSULE, LIQUID FILLED ORAL at 10:05

## 2022-09-01 RX ADMIN — ENOXAPARIN SODIUM 40 MG: 100 INJECTION SUBCUTANEOUS at 10:05

## 2022-09-01 RX ADMIN — ASPIRIN 81 MG CHEWABLE TABLET 81 MG: 81 TABLET CHEWABLE at 10:05

## 2022-09-01 RX ADMIN — ATORVASTATIN CALCIUM 80 MG: 40 TABLET, FILM COATED ORAL at 21:08

## 2022-09-01 RX ADMIN — LISINOPRIL 20 MG: 20 TABLET ORAL at 10:04

## 2022-09-01 RX ADMIN — AMLODIPINE BESYLATE 10 MG: 5 TABLET ORAL at 10:04

## 2022-09-01 NOTE — PROGRESS NOTES
Problem: Falls - Risk of  Goal: *Absence of Falls  Description: Document Ty Timmons Fall Risk and appropriate interventions in the flowsheet.   Outcome: Progressing Towards Goal  Note: Fall Risk Interventions:  Mobility Interventions: Bed/chair exit alarm, Patient to call before getting OOB         Medication Interventions: Bed/chair exit alarm, Assess postural VS orthostatic hypotension, Patient to call before getting OOB, Teach patient to arise slowly    Elimination Interventions: Bed/chair exit alarm, Call light in reach, Patient to call for help with toileting needs              Problem: TIA/CVA Stroke: 0-24 hours  Goal: Off Pathway (Use only if patient is Off Pathway)  Outcome: Progressing Towards Goal  Goal: Activity/Safety  Outcome: Progressing Towards Goal  Goal: Consults, if ordered  Outcome: Progressing Towards Goal  Goal: Diagnostic Test/Procedures  Outcome: Progressing Towards Goal  Goal: Nutrition/Diet  Outcome: Progressing Towards Goal  Goal: Discharge Planning  Outcome: Progressing Towards Goal  Goal: Medications  Outcome: Progressing Towards Goal  Goal: Respiratory  Outcome: Progressing Towards Goal  Goal: Treatments/Interventions/Procedures  Outcome: Progressing Towards Goal  Goal: Minimize risk of bleeding post-thrombolytic infusion  Outcome: Progressing Towards Goal  Goal: Monitor for complications post-thrombolytic infusion  Outcome: Progressing Towards Goal  Goal: Psychosocial  Outcome: Progressing Towards Goal  Goal: *Hemodynamically stable  Outcome: Progressing Towards Goal  Goal: *Neurologically stable  Description: Absence of additional neurological deficits    Outcome: Progressing Towards Goal  Goal: *Verbalizes anxiety and depression are reduced or absent  Outcome: Progressing Towards Goal  Goal: *Absence of Signs of Aspiration on Current Diet  Outcome: Progressing Towards Goal  Goal: *Absence of deep venous thrombosis signs and symptoms(Stroke Metric)  Outcome: Progressing Towards Goal  Goal: *Ability to perform ADLs and demonstrates progressive mobility and function  Outcome: Progressing Towards Goal  Goal: *Stroke education started(Stroke Metric)  Outcome: Progressing Towards Goal  Goal: *Dysphagia screen performed(Stroke Metric)  Outcome: Progressing Towards Goal  Goal: *Rehab consulted(Stroke Metric)  Outcome: Progressing Towards Goal     Problem: TIA/CVA Stroke: Day 2 Until Discharge  Goal: Off Pathway (Use only if patient is Off Pathway)  Outcome: Progressing Towards Goal  Goal: Activity/Safety  Outcome: Progressing Towards Goal  Goal: Diagnostic Test/Procedures  Outcome: Progressing Towards Goal  Goal: Nutrition/Diet  Outcome: Progressing Towards Goal  Goal: Discharge Planning  Outcome: Progressing Towards Goal  Goal: Medications  Outcome: Progressing Towards Goal  Goal: Respiratory  Outcome: Progressing Towards Goal  Goal: Treatments/Interventions/Procedures  Outcome: Progressing Towards Goal  Goal: Psychosocial  Outcome: Progressing Towards Goal  Goal: *Verbalizes anxiety and depression are reduced or absent  Outcome: Progressing Towards Goal  Goal: *Absence of aspiration  Outcome: Progressing Towards Goal  Goal: *Absence of deep venous thrombosis signs and symptoms(Stroke Metric)  Outcome: Progressing Towards Goal  Goal: *Optimal pain control at patient's stated goal  Outcome: Progressing Towards Goal  Goal: *Tolerating diet  Outcome: Progressing Towards Goal  Goal: *Ability to perform ADLs and demonstrates progressive mobility and function  Outcome: Progressing Towards Goal  Goal: *Stroke education continued(Stroke Metric)  Outcome: Progressing Towards Goal     Problem: Ischemic Stroke: Discharge Outcomes  Goal: *Verbalizes anxiety and depression are reduced or absent  Outcome: Progressing Towards Goal  Goal: *Verbalize understanding of risk factor modification(Stroke Metric)  Outcome: Progressing Towards Goal  Goal: *Hemodynamically stable  Outcome: Progressing Towards Goal  Goal: *Absence of aspiration pneumonia  Outcome: Progressing Towards Goal  Goal: *Aware of needed dietary changes  Outcome: Progressing Towards Goal  Goal: *Verbalize understanding of prescribed medications including anti-coagulants, anti-lipid, and/or anti-platelets(Stroke Metric)  Outcome: Progressing Towards Goal  Goal: *Tolerating diet  Outcome: Progressing Towards Goal  Goal: *Aware of follow-up diagnostics related to anticoagulants  Outcome: Progressing Towards Goal  Goal: *Ability to perform ADLs and demonstrates progressive mobility and function  Outcome: Progressing Towards Goal  Goal: *Absence of DVT(Stroke Metric)  Outcome: Progressing Towards Goal  Goal: *Absence of aspiration  Outcome: Progressing Towards Goal  Goal: *Optimal pain control at patient's stated goal  Outcome: Progressing Towards Goal  Goal: *Home safety concerns addressed  Outcome: Progressing Towards Goal  Goal: *Describes available resources and support systems  Outcome: Progressing Towards Goal  Goal: *Verbalizes understanding of activation of EMS(911) for stroke symptoms(Stroke Metric)  Outcome: Progressing Towards Goal  Goal: *Understands and describes signs and symptoms to report to providers(Stroke Metric)  Outcome: Progressing Towards Goal  Goal: *Neurolgocially stable (absence of additional neurological deficits)  Outcome: Progressing Towards Goal  Goal: *Verbalizes importance of follow-up with primary care physician(Stroke Metric)  Outcome: Progressing Towards Goal  Goal: *Smoking cessation discussed,if applicable(Stroke Metric)  Outcome: Progressing Towards Goal  Goal: *Depression screening completed(Stroke Metric)  Outcome: Progressing Towards Goal     Problem: Pressure Injury - Risk of  Goal: *Prevention of pressure injury  Description: Document Jose Antonio Scale and appropriate interventions in the flowsheet.   Outcome: Progressing Towards Goal  Note: Pressure Injury Interventions:  Sensory Interventions: Assess changes in LOC, Keep linens dry and wrinkle-free, Minimize linen layers    Moisture Interventions: Absorbent underpads, Minimize layers    Activity Interventions: Increase time out of bed, PT/OT evaluation    Mobility Interventions: PT/OT evaluation    Nutrition Interventions: Document food/fluid/supplement intake    Friction and Shear Interventions: HOB 30 degrees or less, Minimize layers

## 2022-09-01 NOTE — PROGRESS NOTES
[facility] requested additional information including the patient's ID, an additional bank statement, and more information on her dependents. All information obtained and faxed back to the Business Office at [phone]. Awaiting final answer at this time. Back up plan for patient will be home self-care with some family support.

## 2022-09-01 NOTE — PROGRESS NOTES
PHYSICAL THERAPY TREATMENT  Patient: Alexsander Bermudez (78 y.o. female)  Date: 9/1/2022  Diagnosis: [facility] [I63.9] <principal problem not specified>      Precautions:    Chart, physical therapy assessment, plan of care and goals were reviewed. ASSESSMENT  Patient continues with skilled PT services and is progressing towards goals. Pt found sitting at EOB upon arrival and agreeable to work with therapy. Pt sit <> stand CGA with HW and RUE in sling with a LOB noted upon first standing up and required additional assist from therapist to regain steadiness, then ambulated Min A in the hallways approximately 160ft with an unsteady steppage gait RLE but no LOB noted. Upon return to the room pt sat in high back chair and performed therex, see chart below. Pt still noted to have decrease control and strength on RLE compared to LLE. Pt left sitting in chair with call bell within reach and chair alarm on. Will continue to progress pt towards goals, d/c rec IRF for strengthening and increased mobility. Current Level of Function Impacting Discharge (mobility/balance): CGA/Min A for mobility    Other factors to consider for discharge: PLOF, decreased mobility, decreased RUE/RLE function         PLAN :  Patient continues to benefit from skilled intervention to address the above impairments. Continue treatment per established plan of care. to address goals. Recommendation for discharge: (in order for the patient to meet his/her long term goals)  [facility]     This discharge recommendation:  Has been made in collaboration with the attending provider and/or case management    IF patient discharges home will need the following DME: to be determined (TBD)       SUBJECTIVE:   Patient stated My fingers feel like they are about to move but then it doesn't.     OBJECTIVE DATA SUMMARY:   Critical Behavior:  Neurologic State: Alert  Orientation Level: Oriented X4  Cognition: Follows commands  Safety/Judgement: Decreased insight into deficits  Functional Mobility Training:    Bed Mobility:  Scooting: Stand-by assistance    Transfers:  Sit to Stand: Contact guard assistance  Stand to Sit: Contact guard assistance    Balance:  Sitting: Intact; Without support  Sitting - Static: Good (unsupported)  Sitting - Dynamic: Fair (occasional)  Standing: Impaired; With support  Standing - Static: Fair;Constant support  Standing - Dynamic : Fair;Constant support    Ambulation/Gait Training:  Distance (ft): 160 Feet (ft)  Assistive Device: Walker goldie  Ambulation - Level of Assistance: Minimal assistance  Gait Abnormalities: Steppage gait  Base of Support: Narrowed      Therapeutic Exercises:       EXERCISE   Sets   Reps   Active Active Assist   Passive Self ROM   Comments   Ankle Pumps  10 [x] [] [] []    Long Arc Quads  10 [x] [] [] []    Marching  12 [x] [] [] []       Pain Ratin/10    Activity Tolerance:   Fair  Please refer to the flowsheet for vital signs taken during this treatment. After treatment patient left in no apparent distress:   Sitting in chair, Call bell within reach, and Bed / chair alarm activated    COMMUNICATION/COLLABORATION:   The patients plan of care was discussed with: Registered nurse. HOLDEN Mendiola   Time Calculation: 19 mins    KASANDRA Mendiola, under direct supervision of Judge Ramona PTA provided care and treatment of pt with verbal consent from pt received. Problem: Mobility Impaired (Adult and Pediatric)  Goal: *Acute Goals and Plan of Care (Insert Text)  Description: Patient stated goal: feel better  Patient will move from supine to sit and sit to supine , scoot up and down, and roll side to side in bed with supervision/set-up within 7 day(s). Patient will transfer from bed to chair and chair to bed with minimal assistance/contact guard assist using the least restrictive device within 7 day(s).     Patient will improve static standing balance to supervision within 1 week(s). Patient will ambulate 40 feet with minimal assistance with least restrictive device within 1 weeks.      Outcome: Progressing Towards Goal

## 2022-09-01 NOTE — PROGRESS NOTES
Nutrition Assessment     Type and Reason for Visit: Initial, RD nutrition re-screen/LOS (x6 days)    Nutrition Recommendations/Plan:   Continue diet. Please continue to monitor and document PO intake, Bms in I/Os. Nutrition Assessment:   Admitted for acute CVA and right-sided weakness. Good appetite reported w/ good intake- ~75% of Breakfast reported. No h/o wt loss nor poor PO intake PTA. Pt had no nutrition concerns. Continue diet. Labs unremarkable. Meds: colace, lisinopril, lipitor, norvasc. Malnutrition Assessment:  Malnutrition Status: No malnutrition     Nutrition Related Findings:  NFPE w/o acute findings- nourished. No N/V/D/C nor c/s issues per Pt. Noted need for dysarthria tx per SLP; rec's Regular/Thin Liq diet. Last BM 8/31. No edema. Current Nutrition Therapies:  ADULT DIET Regular; Low Sodium (2 gm)    Anthropometric Measures:  Height:  5' 6\" (167.6 cm)  Current Body Wt:  73.9 kg (162 lb 14.7 oz) (8/27)  BMI: 26.3    Nutrition Diagnosis:   No nutrition diagnosis at this time     Nutrition Interventions:   Food and/or Nutrient Delivery: Continue current diet  Nutrition Education/Counseling: No recommendations at this time  Coordination of Nutrition Care: Continue to monitor while inpatient  Plan of Care discussed with: RN, Pt    Goals:  Goals: Meet at least 75% of estimated needs, PO intake 75% or greater, within 7 days    Nutrition Monitoring and Evaluation:   Behavioral-Environmental Outcomes: None identified  Food/Nutrient Intake Outcomes: Diet advancement/tolerance, Food and nutrient intake  Physical Signs/Symptoms Outcomes: Biochemical data, Meal time behavior, Weight    Discharge Planning:    No discharge needs at this time    Jasmeet Lane RD  Contact: ext. [phone] or TuneIn.

## 2022-09-01 NOTE — PROGRESS NOTES
Hospitalist Progress Note         Robert Palomares MD          Daily Progress Note: 9/1/2022      Subjective: The patient is seen for follow  up.  75-year-old with history of essential hypertension and chronic tobacco use admitted for sudden onset right-sided weakness. Admitted for acute CVA work-up. MRI brain showed acute left corona radiata infarction. Unfortunately, patient does not have any health insurance which is making rehab referral problematic  ---  She is seen in follow-up. Patient has continued symptoms of right-sided weakness. We are awaiting evaluation by [facility] for possible renate bed    Problem List:  Problem List as of 9/1/2022 Never Reviewed            Codes Class Noted - Resolved    CVA (cerebral vascular accident) [facility]) ICD-10-CM: I63.9  ICD-9-CM: 434.91  8/26/2022 - Present       Medications reviewed  Current Facility-Administered Medications   Medication Dose Route Frequency    docusate sodium (COLACE) capsule 200 mg  200 mg Oral DAILY    polyethylene glycol (MIRALAX) packet 17 g  17 g Oral PRN    atorvastatin (LIPITOR) tablet 80 mg  80 mg Oral QHS    lisinopriL (PRINIVIL, ZESTRIL) tablet 20 mg  20 mg Oral DAILY    acetaminophen (TYLENOL) tablet 650 mg  650 mg Oral Q4H PRN    Or    acetaminophen (TYLENOL) solution 650 mg  650 mg Per NG tube Q4H PRN    Or    acetaminophen (TYLENOL) suppository 650 mg  650 mg Rectal Q4H PRN    aspirin chewable tablet 81 mg  81 mg Oral DAILY    enoxaparin (LOVENOX) injection 40 mg  40 mg SubCUTAneous Q24H    amLODIPine (NORVASC) tablet 10 mg  10 mg Oral DAILY    hydrALAZINE (APRESOLINE) 20 mg/mL injection 10 mg  10 mg IntraVENous Q6H PRN    nicotine (NICODERM CQ) 14 mg/24 hr patch 1 Patch  1 Patch TransDERmal DAILY       Review of Systems:   A comprehensive review of systems was negative except for that written in the HPI.     Objective:   Physical Exam:     Visit Vitals  /87 (BP 1 Location: Left upper arm) Pulse 87   Temp 98.8 °F (37.1 °C)   Resp 20   Ht 5' 6\" (1.676 m)   Wt 73.9 kg (163 lb)   SpO2 100%   BMI 26.31 kg/m²      O2 Device: None (Room air)    Temp (24hrs), Av.3 °F (36.8 °C), Min:98 °F (36.7 °C), Max:98.8 °F (37.1 °C)    No intake/output data recorded. No intake/output data recorded. General:  Alert, cooperative, no distress, appears stated age. Lungs:   Clear to auscultation bilaterally. Chest wall:  No tenderness or deformity. Heart:  Regular rate and rhythm, S1, S2 normal, no murmur, click, rub or gallop. Abdomen:   Soft, non-tender. Bowel sounds normal. No masses,  No organomegaly. Extremities: Extremities normal, atraumatic, no cyanosis or edema. Pulses: 2+ and symmetric all extremities. Skin: Skin color, texture, turgor normal. No rashes or lesions   Neurologic: CNII-XII intact. RUE . Data Review:       Recent Days:  No results for input(s): WBC, HGB, HCT, PLT, HGBEXT, HCTEXT, PLTEXT, HGBEXT, HCTEXT, PLTEXT in the last 72 hours. No results for input(s): NA, K, CL, CO2, GLU, BUN, CREA, CA, MG, PHOS, ALB, TBIL, TBILI, ALT, INR, INREXT, INREXT in the last 72 hours. No lab exists for component: SGOT    No results for input(s): PH, PCO2, PO2, HCO3, FIO2 in the last 72 hours. 24 Hour Results:  No results found for this or any previous visit (from the past 24 hour(s)).           Assessment/     Acute ischemic stroke left corona radiata with right hemiplegia, arm worse than leg    Uncontrolled hypertension    -Continue amlodipine 10 mg oral daily    -continue lisinopril to 20 mg oral daily    -Hydralazine 10 mg IV every 4 hours as needed for systolic blood pressure over 150    Ambulatory and gait disturbance    -Due to acute CVA    -Will   need inpatient rehabilitation    Tobacco abuse    Hyperlipidemia      Plan:  Continue high intensity statin and aspirin  Continue antihypertensives  Case management working on rehab options (ie [facility] bed)    If patient declined by [facility] plan will be for discharge home    Care Plan discussed with: Patient/Family    Total time spent with patient: 30 minutes.     Joe Guajardo MD

## 2022-09-01 NOTE — PROGRESS NOTES
OCCUPATIONAL THERAPY TREATMENT  Patient: Delfina Lynne (3 y.o. female)  Date: 9/1/2022  Diagnosis: [facility] [I63.9] <principal problem not specified>      Precautions:    Chart, occupational therapy assessment, plan of care, and goals were reviewed. ASSESSMENT  Patient continues with skilled OT services and is progressing towards goals. Upon MONTIEL arrival, pt sitting in chair and agreeable to tx session. Pt completed sit>stand from chair with CGA/[place]awa using goldie walker for balance upon standing. Pt ambulated in hallway in preparation for household mobility with Hawa for balance, pt noted with one LOB throughout ambulation. Once back in chair, doffing of sling completed with MaxA. UE therex completed (see chart below for details). Pt noted with improvement in mobility of R scapula with demonstrating minimal movement. Pt noted with improvement in overall mobility this date with no knee buckling noted and balance improved. Pt left sitting in chair with call bell within reach, needs met, and chair alarm activated. Will continue to follow pt throughout remainder of stay and progress towards OT goals. Recommending IRF at discharge when medically appropriate. Other factors to consider for discharge: family/social support, DME, time since onset, severity of deficits, decline from functional baseline         PLAN :  Patient continues to benefit from skilled intervention to address the above impairments. Continue treatment per established plan of care. to address goals. Recommendation for discharge: (in order for the patient to meet his/her long term goals)  [facility]     This discharge recommendation:  Has been made in collaboration with the attending provider and/or case management    IF patient discharges home will need the following DME: TBD       SUBJECTIVE:   Patient stated I walked good earlier today.     OBJECTIVE DATA SUMMARY: Cognitive/Behavioral Status:  Neurologic State: Alert  Orientation Level: Oriented X4  Cognition: Follows commands    Functional Mobility and Transfers for ADLs:  Bed Mobility:  Scooting: Stand-by assistance    Transfers:  Sit to Stand: Contact guard assistance;Minimum assistance    Balance:  Sitting: Impaired; With support  Sitting - Static: Good (unsupported)  Sitting - Dynamic: Fair (occasional)  Standing: Impaired; With support  Standing - Static: Constant support; Fair  Standing - Dynamic : Constant support; Fair    ADL Intervention:  Upper Body Dressing Assistance  Orthotics(Brace): Maximum assistance    Therapeutic Exercises:   Exercise Sets Reps AROM AAROM PROM Self PROM Comments   Shoulder flex/ext 1 10 [] [] [x] [] RUE   Elbow flex/ext 1 10 [] [] [x] [] RUE   Wrist flex/ext 1 5 [] [] [x] [] RUE   Digit flex/ext 1 10 [] [] [x] [] RUE   D1 1 10 [] [] [x] [] RUE   D2 1 10 [] [] [x] [] RUE   Scapular elevation/depression 1 10 [] [x] [] [] RUE     Pain:  0/10    Activity Tolerance:   Fair  Please refer to the flowsheet for vital signs taken during this treatment. After treatment patient left in no apparent distress:   Bed locked and in lowest position, Sitting in chair and Call bell within reach    COMMUNICATION/COLLABORATION:   The patients plan of care was discussed with: Physical therapy assistant and Physician.      TIANA Sánchez  Time Calculation: 28 mins    Problem: Self Care Deficits Care Plan (Adult)  Goal: *Acute Goals and Plan of Care (Insert Text)  Description: Pt will be IND self feeding  Pt will be IND sup<->sit in prep for EOB ADL's  Pt will be IND LB dressing EOB level  Pt will be IND sit EOB 10 minutes in prep for EOB ADL's  Pt will be IND sit<-> prep for toilet transfer  Pt will be IND BSC/toilet transfer with LRAD  Pt will be IND toileting/cloth mgmt LRAD  Pt will be IND grooming standing sink  Pt will be IND bathing sitting/standing sink LRAD  Pt will be MI sulema UE HEP in prep for self care tasks    Outcome: Progressing Towards Goal

## 2022-09-02 LAB
GLUCOSE BLD STRIP.AUTO-MCNC: 112 MG/DL (ref 65–100)
GLUCOSE BLD STRIP.AUTO-MCNC: 97 MG/DL (ref 65–100)
PERFORMED BY, TECHID: ABNORMAL
PERFORMED BY, TECHID: NORMAL

## 2022-09-02 PROCEDURE — 74011250636 HC RX REV CODE- 250/636: Performed by: INTERNAL MEDICINE

## 2022-09-02 PROCEDURE — 74011250637 HC RX REV CODE- 250/637: Performed by: INTERNAL MEDICINE

## 2022-09-02 PROCEDURE — 97116 GAIT TRAINING THERAPY: CPT

## 2022-09-02 PROCEDURE — 97530 THERAPEUTIC ACTIVITIES: CPT

## 2022-09-02 PROCEDURE — 82962 GLUCOSE BLOOD TEST: CPT

## 2022-09-02 PROCEDURE — 97110 THERAPEUTIC EXERCISES: CPT

## 2022-09-02 PROCEDURE — 65270000029 HC RM PRIVATE

## 2022-09-02 RX ORDER — AMLODIPINE BESYLATE 10 MG/1
10 TABLET ORAL DAILY
Qty: 30 TABLET | Refills: 0 | Status: SHIPPED | OUTPATIENT
Start: 2022-09-03

## 2022-09-02 RX ORDER — DOCUSATE SODIUM 100 MG/1
200 CAPSULE, LIQUID FILLED ORAL DAILY
Qty: 60 CAPSULE | Refills: 2 | Status: SHIPPED
Start: 2022-09-03 | End: 2022-12-02

## 2022-09-02 RX ORDER — GUAIFENESIN 100 MG/5ML
81 LIQUID (ML) ORAL DAILY
Qty: 30 TABLET | Refills: 0 | Status: SHIPPED
Start: 2022-09-03

## 2022-09-02 RX ORDER — ATORVASTATIN CALCIUM 80 MG/1
80 TABLET, FILM COATED ORAL
Qty: 30 TABLET | Refills: 0 | Status: SHIPPED | OUTPATIENT
Start: 2022-09-02

## 2022-09-02 RX ORDER — LISINOPRIL 20 MG/1
20 TABLET ORAL DAILY
Qty: 30 TABLET | Refills: 0 | Status: SHIPPED | OUTPATIENT
Start: 2022-09-03

## 2022-09-02 RX ADMIN — LISINOPRIL 20 MG: 20 TABLET ORAL at 10:06

## 2022-09-02 RX ADMIN — DOCUSATE SODIUM 200 MG: 100 CAPSULE, LIQUID FILLED ORAL at 09:30

## 2022-09-02 RX ADMIN — AMLODIPINE BESYLATE 10 MG: 5 TABLET ORAL at 09:31

## 2022-09-02 RX ADMIN — ENOXAPARIN SODIUM 40 MG: 100 INJECTION SUBCUTANEOUS at 09:33

## 2022-09-02 RX ADMIN — ATORVASTATIN CALCIUM 80 MG: 40 TABLET, FILM COATED ORAL at 21:05

## 2022-09-02 RX ADMIN — ASPIRIN 81 MG CHEWABLE TABLET 81 MG: 81 TABLET CHEWABLE at 09:32

## 2022-09-02 NOTE — PROGRESS NOTES
PHYSICAL THERAPY TREATMENT  Patient: Debra Solomon (1 y.o. female)  Date: 9/2/2022  Diagnosis: CVA (cerebral vascular accident) [facility]) [I63.9] <principal problem not specified>      Precautions:    Chart, physical therapy assessment, plan of care and goals were reviewed. ASSESSMENT  Patient continues with skilled PT services and is progressing towards goals. Pt found supine in bed and agreeable to therapy but pt noted to be tired today and stated she didn't sleep much last night. Pt scooted and supine > sit SBA to the EOB when SPTA helped Pt don sling on RUE due to slight subluxation per OTs note from previous session. Pt sit > stand CGA with HW and ambulated in the hallway approximately 250ft with an unsteady gait, dragging of the R foot, and slight LOB x2 noted and pt required assistance to recover balance. Secondary to deficits written above, SPTA still recommends pt still has constant assistance when OOB. Upon return to the room pt sat EOB and performed therex, see chart below. Pt noted to have a posterior postural sway using trunk muscles to assist in RLE exercises. Pt sit > supine CGA for RLE. Once supine, pt performed quad sets to help strengthen quad muscles. Pt was left in bed with call bell within reach. Will continue to progress pt towards goals, d/c rec IRF for increased strength and decreased mobility since CVA, pt remains very motivated for therapy. Current Level of Function Impacting Discharge (mobility/balance): Min A for mobility    Other factors to consider for discharge: safety, PLOF, decreased mobility         PLAN :  Patient continues to benefit from skilled intervention to address the above impairments. Continue treatment per established plan of care. to address goals.     Recommendation for discharge: (in order for the patient to meet his/her long term goals)  [address]     This discharge recommendation:  Has been made in collaboration with the attending provider and/or case management    IF patient discharges home will need the following DME: Rec IRF, however if pt goes home will need goldie-walker        SUBJECTIVE:   Patient stated I was feeling a bit depressed this morning.     OBJECTIVE DATA SUMMARY:   Critical Behavior:  Neurologic State: Alert  Orientation Level: Oriented X4  Cognition: Follows commands  Safety/Judgement: Decreased insight into deficits  Functional Mobility Training:    Bed Mobility:  Rolling: Stand-by assistance  Supine to Sit: Stand-by assistance  Sit to Supine: Contact guard assistance  Scooting: Stand-by assistance    Transfers:  Sit to Stand: Contact guard assistance  Stand to Sit: Contact guard assistance    Balance:  Sitting: Intact; With support  Sitting - Static: Good (unsupported)  Sitting - Dynamic: Fair (occasional)  Standing: Impaired; With support  Standing - Static: Fair;Constant support  Standing - Dynamic : Fair;Constant support    Ambulation/Gait Training:  Distance (ft): 250 Feet (ft)  Assistive Device: Walker goldie;Gait belt  Ambulation - Level of Assistance: Minimal assistance  Base of Support: Narrowed      Therapeutic Exercises:       EXERCISE   Sets   Reps   Active Active Assist   Passive Self ROM   Comments   Ankle Pumps  15 [x] [] [] [] sitting   Quad Sets  10 [x] [] [] [] supine   Long Arc Quads  15 [x] [] [] [] sitting   Marching  15 [x] [] [] [] sitting      Pain Ratin/10    Activity Tolerance:   Good  Please refer to the flowsheet for vital signs taken during this treatment. After treatment patient left in no apparent distress:   Bed returned to lowest position, Supine in bed and Call bell within reach    COMMUNICATION/COLLABORATION:   The patients plan of care was discussed with: Occupational therapy assistant and Registered nurse.      HOLDEN Ambriz   Time Calculation: 25 mins    KASANDRA Ambriz, under direct supervision of Antoinette Knight PTA provided care and treatment of pt with verbal consent from pt received. Problem: Mobility Impaired (Adult and Pediatric)  Goal: *Acute Goals and Plan of Care (Insert Text)  Description: Patient stated goal: feel better  Patient will move from supine to sit and sit to supine , scoot up and down, and roll side to side in bed with supervision/set-up within 7 day(s). Patient will transfer from bed to chair and chair to bed with minimal assistance/contact guard assist using the least restrictive device within 7 day(s). Patient will improve static standing balance to supervision within 1 week(s). Patient will ambulate 40 feet with minimal assistance with least restrictive device within 1 weeks.      Outcome: Progressing Towards Goal

## 2022-09-02 NOTE — PROGRESS NOTES
CM received call from [employer] with [address] [place] [address] indicating their  Business Office has approved the patient's renate application at 224%. They can accept the patient, however, they do not have a bed available until next Wednesday. CM discussed with PT and OT and they do not feel the patient is safe to return home alone. She would not be able to get any HH or OP services as her medicaid is still pending. Even though patient is able to ambulate therapy stated she is unsteady, has dragging of the right foot, and some loss of balance. CM discussed with attending and at this time since it is unsafe for patient to return home alone and she has been approved renate at [address] [place], we will keep patient until Wednesday. Patient is agreeable to this plan as well.

## 2022-09-02 NOTE — PROGRESS NOTES
OCCUPATIONAL THERAPY TREATMENT  Patient: Yelitza Hall (42 y.o. female)  Date: 9/2/2022  Diagnosis: CVA (cerebral vascular accident) [facility]) [I63.9] <principal problem not specified>      Precautions:    Chart, occupational therapy assessment, plan of care, and goals were reviewed. ASSESSMENT  Patient continues with skilled OT services and is progressing towards goals. Upon MONTIEL arrival, pt semi supine in bed and agreeable to tx session. Pt completed bed mobility with SBA and additional time for rolling, supine>sit, and scooting to EOB. Pt completed sit>stand from EOB with CGA using goldie walker for balance upon standing. Pt ambulated into hallway with [place], pt noted with 3-4 LOB throughout ambulation requiring Min/ModA for correction. Pt returned to room and completed transfer back to EOB. Pt completed seated EOB UE therex (see chart below for details). Pt returned to supine with CGA and scooted self to [facility]. Pt left semi supine in bed with call bell within reach and needs met. Will continue to follow pt throughout remainder of stay and progress towards OT goals. Recommending IRF at discharge when medically appropriate. Other factors to consider for discharge: family/social support, DME, time since onset, severity of deficits, decline from functional baseline         PLAN :  Patient continues to benefit from skilled intervention to address the above impairments. Continue treatment per established plan of care. to address goals. Recommendation for discharge: (in order for the patient to meet his/her long term goals)  [address]     This discharge recommendation:  Has been made in collaboration with the attending provider and/or case management    IF patient discharges home will need the following DME: TBD       SUBJECTIVE:   Patient stated I prayed about being able to go to rehab.     OBJECTIVE DATA SUMMARY:   Cognitive/Behavioral Status:  Neurologic State: Alert  Orientation Level: Appropriate for age  Cognition: Follows commands    Functional Mobility and Transfers for ADLs:  Bed Mobility:  Rolling: Stand-by assistance; Additional time  Supine to Sit: Stand-by assistance; Additional time  Sit to Supine: Contact guard assistance  Scooting: Stand-by assistance    Transfers:  Sit to Stand: Contact guard assistance    Balance:  Sitting: Impaired; With support  Sitting - Static: Good (unsupported)  Sitting - Dynamic: Fair (occasional)  Standing: Impaired; With support  Standing - Static: Constant support; Fair  Standing - Dynamic : Constant support; Fair    ADL Intervention:  Upper Body Dressing Assistance  Orthotics(Brace): Maximum assistance    Lower Body Dressing Assistance  Socks: Minimum assistance    Therapeutic Exercises:   Exercise Sets Reps AROM AAROM PROM Self PROM Comments   Shoulder flex/ext 1 10 [] [] [x] []    Elbow flex/ext 1 10 [] [] [x] []    Scapular protraction/retraction 1 10 [] [] [x] []    Scapular elevation/depression 1 10 [] [] [x] []      Pain:  0/10    Activity Tolerance:   Fair  Please refer to the flowsheet for vital signs taken during this treatment. After treatment patient left in no apparent distress:   Bed locked and in lowest position, Supine in bed and Call bell within reach    COMMUNICATION/COLLABORATION:   The patients plan of care was discussed with: Physical therapy assistant and Case management.      TIANA Sánchez  Time Calculation: 29 mins    Problem: Self Care Deficits Care Plan (Adult)  Goal: *Acute Goals and Plan of Care (Insert Text)  Description: Pt will be IND self feeding  Pt will be IND sup<->sit in prep for EOB ADL's  Pt will be IND LB dressing EOB level  Pt will be IND sit EOB 10 minutes in prep for EOB ADL's  Pt will be IND sit<-> prep for toilet transfer  Pt will be IND BSC/toilet transfer with LRAD  Pt will be IND toileting/cloth mgmt LRAD  Pt will be IND grooming standing sink  Pt will be IND bathing sitting/standing sink LRAD  Pt will be MI sulema UE HEP in prep for self care tasks    Outcome: Progressing Towards Goal

## 2022-09-03 PROCEDURE — 74011250636 HC RX REV CODE- 250/636: Performed by: INTERNAL MEDICINE

## 2022-09-03 PROCEDURE — 65270000029 HC RM PRIVATE

## 2022-09-03 PROCEDURE — 74011250637 HC RX REV CODE- 250/637: Performed by: INTERNAL MEDICINE

## 2022-09-03 RX ADMIN — LISINOPRIL 20 MG: 20 TABLET ORAL at 08:27

## 2022-09-03 RX ADMIN — ATORVASTATIN CALCIUM 80 MG: 40 TABLET, FILM COATED ORAL at 21:27

## 2022-09-03 RX ADMIN — AMLODIPINE BESYLATE 10 MG: 5 TABLET ORAL at 08:27

## 2022-09-03 RX ADMIN — ASPIRIN 81 MG CHEWABLE TABLET 81 MG: 81 TABLET CHEWABLE at 08:27

## 2022-09-03 RX ADMIN — ENOXAPARIN SODIUM 40 MG: 100 INJECTION SUBCUTANEOUS at 08:26

## 2022-09-03 NOTE — PROGRESS NOTES
Hospitalist Progress Note         Kevan Squires MD          Daily Progress Note: 9/3/2022      Subjective: The patient is seen for follow  up.  78-year-old with history of essential hypertension and chronic tobacco use admitted for sudden onset right-sided weakness. Admitted for acute CVA work-up. MRI brain showed acute left corona radiata infarction. Unfortunately, patient does not have any health insurance which is making rehab referral problematic  ---  She is seen in follow-up. Per case management patient has been accepted on a renate basis to [address] [place] [address] but they will not have a bed until Tuesday or Wednesday of next week.   They would be unable to take patient if she is discharged home prior to the    Problem List:  Problem List as of 9/3/2022 Never Reviewed            Codes Class Noted - Resolved    CVA (cerebral vascular accident) [facility]) ICD-10-CM: I63.9  ICD-9-CM: 434.91  8/26/2022 - Present       Medications reviewed  Current Facility-Administered Medications   Medication Dose Route Frequency    docusate sodium (COLACE) capsule 200 mg  200 mg Oral DAILY    polyethylene glycol (MIRALAX) packet 17 g  17 g Oral PRN    atorvastatin (LIPITOR) tablet 80 mg  80 mg Oral QHS    lisinopriL (PRINIVIL, ZESTRIL) tablet 20 mg  20 mg Oral DAILY    acetaminophen (TYLENOL) tablet 650 mg  650 mg Oral Q4H PRN    Or    acetaminophen (TYLENOL) solution 650 mg  650 mg Per NG tube Q4H PRN    Or    acetaminophen (TYLENOL) suppository 650 mg  650 mg Rectal Q4H PRN    aspirin chewable tablet 81 mg  81 mg Oral DAILY    enoxaparin (LOVENOX) injection 40 mg  40 mg SubCUTAneous Q24H    amLODIPine (NORVASC) tablet 10 mg  10 mg Oral DAILY    hydrALAZINE (APRESOLINE) 20 mg/mL injection 10 mg  10 mg IntraVENous Q6H PRN    nicotine (NICODERM CQ) 14 mg/24 hr patch 1 Patch  1 Patch TransDERmal DAILY       Review of Systems:   A comprehensive review of systems was negative except for that written in the HPI. Objective:   Physical Exam:     Visit Vitals  /79 (BP 1 Location: Left upper arm, BP Patient Position: At rest;Semi fowlers)   Pulse 69   Temp 97.8 °F (36.6 °C)   Resp 18   Ht 5' 6\" (1.676 m)   Wt 73.9 kg (163 lb)   SpO2 99%   BMI 26.31 kg/m²      O2 Device: None (Room air)    Temp (24hrs), Av.6 °F (36.4 °C), Min:97.5 °F (36.4 °C), Max:97.8 °F (36.6 °C)    No intake/output data recorded. No intake/output data recorded. General:  Alert, cooperative, no distress, appears stated age. Lungs:   Clear to auscultation bilaterally. Chest wall:  No tenderness or deformity. Heart:  Regular rate and rhythm, S1, S2 normal, no murmur, click, rub or gallop. Abdomen:   Soft, non-tender. Bowel sounds normal. No masses,  No organomegaly. Extremities: Extremities normal, atraumatic, no cyanosis or edema. Pulses: 2+ and symmetric all extremities. Skin: Skin color, texture, turgor normal. No rashes or lesions   Neurologic: CNII-XII intact. RUE . Data Review:       Recent Days:  No results for input(s): WBC, HGB, HCT, PLT, HGBEXT, HCTEXT, PLTEXT, HGBEXT, HCTEXT, PLTEXT in the last 72 hours. No results for input(s): NA, K, CL, CO2, GLU, BUN, CREA, CA, MG, PHOS, ALB, TBIL, TBILI, ALT, INR, INREXT, INREXT in the last 72 hours. No lab exists for component: SGOT    No results for input(s): PH, PCO2, PO2, HCO3, FIO2 in the last 72 hours.     24 Hour Results:  Recent Results (from the past 24 hour(s))   GLUCOSE, POC    Collection Time: 22 11:33 AM   Result Value Ref Range    Glucose (POC) 112 (H) 65 - 100 mg/dL    Performed by Chao Suazo              Assessment/     Acute ischemic stroke left corona radiata with right hemiplegia, arm worse than leg    Uncontrolled hypertension    -Continue amlodipine 10 mg oral daily    -continue lisinopril to 20 mg oral daily    -Hydralazine 10 mg IV every 4 hours as needed for systolic blood pressure over 150    Ambulatory and gait disturbance    -Due to acute CVA    -Will   need inpatient rehabilitation    Tobacco abuse    Hyperlipidemia      Plan:  Continue high intensity statin and aspirin  Continue antihypertensives    Plan is for discharge to [facility] next week    Care Plan discussed with: Patient/Family    Total time spent with patient: 30 minutes.     Parveen Snyder MD

## 2022-09-03 NOTE — PROGRESS NOTES
Hospitalist Progress Note         Arthur Knowles MD          Daily Progress Note: 9/3/2022      Subjective: The patient is seen for follow  up.  68-year-old with history of essential hypertension and chronic tobacco use admitted for sudden onset right-sided weakness. Admitted for acute CVA work-up. MRI brain showed acute left corona radiata infarction. Unfortunately, patient does not have any health insurance which is making rehab referral problematic  ---  She is seen in follow-up. Patient has continued symptoms of right-sided weakness. We are awaiting evaluation by [facility] for possible renate bed    Problem List:  Problem List as of 9/3/2022 Never Reviewed            Codes Class Noted - Resolved    CVA (cerebral vascular accident) [facility]) ICD-10-CM: I63.9  ICD-9-CM: 434.91  8/26/2022 - Present       Medications reviewed  Current Facility-Administered Medications   Medication Dose Route Frequency    docusate sodium (COLACE) capsule 200 mg  200 mg Oral DAILY    polyethylene glycol (MIRALAX) packet 17 g  17 g Oral PRN    atorvastatin (LIPITOR) tablet 80 mg  80 mg Oral QHS    lisinopriL (PRINIVIL, ZESTRIL) tablet 20 mg  20 mg Oral DAILY    acetaminophen (TYLENOL) tablet 650 mg  650 mg Oral Q4H PRN    Or    acetaminophen (TYLENOL) solution 650 mg  650 mg Per NG tube Q4H PRN    Or    acetaminophen (TYLENOL) suppository 650 mg  650 mg Rectal Q4H PRN    aspirin chewable tablet 81 mg  81 mg Oral DAILY    enoxaparin (LOVENOX) injection 40 mg  40 mg SubCUTAneous Q24H    amLODIPine (NORVASC) tablet 10 mg  10 mg Oral DAILY    hydrALAZINE (APRESOLINE) 20 mg/mL injection 10 mg  10 mg IntraVENous Q6H PRN    nicotine (NICODERM CQ) 14 mg/24 hr patch 1 Patch  1 Patch TransDERmal DAILY       Review of Systems:   A comprehensive review of systems was negative except for that written in the HPI.     Objective:   Physical Exam:     Visit Vitals  /79 (BP 1 Location: Left upper arm, BP Patient Position: At rest;Semi fowlers)   Pulse 69   Temp 97.8 °F (36.6 °C)   Resp 18   Ht 5' 6\" (1.676 m)   Wt 73.9 kg (163 lb)   SpO2 99%   BMI 26.31 kg/m²      O2 Device: None (Room air)    Temp (24hrs), Av.6 °F (36.4 °C), Min:97.5 °F (36.4 °C), Max:97.8 °F (36.6 °C)    No intake/output data recorded. No intake/output data recorded. General:  Alert, cooperative, no distress, appears stated age. Lungs:   Clear to auscultation bilaterally. Chest wall:  No tenderness or deformity. Heart:  Regular rate and rhythm, S1, S2 normal, no murmur, click, rub or gallop. Abdomen:   Soft, non-tender. Bowel sounds normal. No masses,  No organomegaly. Extremities: Extremities normal, atraumatic, no cyanosis or edema. Pulses: 2+ and symmetric all extremities. Skin: Skin color, texture, turgor normal. No rashes or lesions   Neurologic: CNII-XII intact. RUE . Data Review:       Recent Days:  No results for input(s): WBC, HGB, HCT, PLT, HGBEXT, HCTEXT, PLTEXT, HGBEXT, HCTEXT, PLTEXT in the last 72 hours. No results for input(s): NA, K, CL, CO2, GLU, BUN, CREA, CA, MG, PHOS, ALB, TBIL, TBILI, ALT, INR, INREXT, INREXT in the last 72 hours. No lab exists for component: SGOT    No results for input(s): PH, PCO2, PO2, HCO3, FIO2 in the last 72 hours.     24 Hour Results:  Recent Results (from the past 24 hour(s))   GLUCOSE, POC    Collection Time: 22 11:33 AM   Result Value Ref Range    Glucose (POC) 112 (H) 65 - 100 mg/dL    Performed by Gila Kim              Assessment/     Acute ischemic stroke left corona radiata with right hemiplegia, arm worse than leg    Uncontrolled hypertension    -Continue amlodipine 10 mg oral daily    -continue lisinopril to 20 mg oral daily    -Hydralazine 10 mg IV every 4 hours as needed for systolic blood pressure over 150    Ambulatory and gait disturbance    -Due to acute CVA    -Will   need inpatient rehabilitation    Tobacco abuse    Hyperlipidemia      Plan:  Continue high intensity statin and aspirin  Continue antihypertensives  Case management working on rehab options (ie renate bed)    If patient declined by [facility] will be for discharge home    Care Plan discussed with: Patient/Family    Total time spent with patient: 30 minutes.     Arthur Knowles MD

## 2022-09-04 PROCEDURE — 74011250637 HC RX REV CODE- 250/637: Performed by: INTERNAL MEDICINE

## 2022-09-04 PROCEDURE — 97530 THERAPEUTIC ACTIVITIES: CPT

## 2022-09-04 PROCEDURE — 74011250636 HC RX REV CODE- 250/636: Performed by: INTERNAL MEDICINE

## 2022-09-04 PROCEDURE — 65270000029 HC RM PRIVATE

## 2022-09-04 RX ADMIN — ASPIRIN 81 MG CHEWABLE TABLET 81 MG: 81 TABLET CHEWABLE at 09:18

## 2022-09-04 RX ADMIN — DOCUSATE SODIUM 200 MG: 100 CAPSULE, LIQUID FILLED ORAL at 09:18

## 2022-09-04 RX ADMIN — ENOXAPARIN SODIUM 40 MG: 100 INJECTION SUBCUTANEOUS at 09:18

## 2022-09-04 RX ADMIN — AMLODIPINE BESYLATE 10 MG: 5 TABLET ORAL at 09:18

## 2022-09-04 RX ADMIN — ATORVASTATIN CALCIUM 80 MG: 40 TABLET, FILM COATED ORAL at 21:06

## 2022-09-04 RX ADMIN — LISINOPRIL 20 MG: 20 TABLET ORAL at 09:19

## 2022-09-04 NOTE — PROGRESS NOTES
Hospitalist Progress Note         David Dejesus MD          Daily Progress Note: 9/4/2022      Subjective: The patient is seen for follow  up.  27-year-old with history of essential hypertension and chronic tobacco use admitted for sudden onset right-sided weakness. Admitted for acute CVA work-up. MRI brain showed acute left corona radiata infarction. Unfortunately, patient does not have any health insurance which is making rehab referral problematic    She has been accepted for renate bed at [address] [place] [address] but they will not have a bed until Tuesday or Wednesday  ---  She is seen in follow-up. She is doing well. Right-sided weakness unchanged although patient feels like it is slowly getting better.     Problem List:  Problem List as of 9/4/2022 Never Reviewed            Codes Class Noted - Resolved    CVA (cerebral vascular accident) [facility]) ICD-10-CM: I63.9  ICD-9-CM: 434.91  8/26/2022 - Present       Medications reviewed  Current Facility-Administered Medications   Medication Dose Route Frequency    docusate sodium (COLACE) capsule 200 mg  200 mg Oral DAILY    polyethylene glycol (MIRALAX) packet 17 g  17 g Oral PRN    atorvastatin (LIPITOR) tablet 80 mg  80 mg Oral QHS    lisinopriL (PRINIVIL, ZESTRIL) tablet 20 mg  20 mg Oral DAILY    acetaminophen (TYLENOL) tablet 650 mg  650 mg Oral Q4H PRN    Or    acetaminophen (TYLENOL) solution 650 mg  650 mg Per NG tube Q4H PRN    Or    acetaminophen (TYLENOL) suppository 650 mg  650 mg Rectal Q4H PRN    aspirin chewable tablet 81 mg  81 mg Oral DAILY    enoxaparin (LOVENOX) injection 40 mg  40 mg SubCUTAneous Q24H    amLODIPine (NORVASC) tablet 10 mg  10 mg Oral DAILY    hydrALAZINE (APRESOLINE) 20 mg/mL injection 10 mg  10 mg IntraVENous Q6H PRN    nicotine (NICODERM CQ) 14 mg/24 hr patch 1 Patch  1 Patch TransDERmal DAILY       Review of Systems:   A comprehensive review of systems was negative except for that written in the HPI.    Objective:   Physical Exam:     Visit Vitals  /84   Pulse 74   Temp 97.4 °F (36.3 °C)   Resp 18   Ht 5' 6\" (1.676 m)   Wt 73.9 kg (163 lb)   SpO2 97%   BMI 26.31 kg/m²      O2 Device: None (Room air)    Temp (24hrs), Av.9 °F (36.6 °C), Min:97.4 °F (36.3 °C), Max:98.5 °F (36.9 °C)    No intake/output data recorded. No intake/output data recorded. General:  Alert, cooperative, no distress, appears stated age. Lungs:   Clear to auscultation bilaterally. Chest wall:  No tenderness or deformity. Heart:  Regular rate and rhythm, S1, S2 normal, no murmur, click, rub or gallop. Abdomen:   Soft, non-tender. Bowel sounds normal. No masses,  No organomegaly. Extremities: Extremities normal, atraumatic, no cyanosis or edema. Pulses: 2+ and symmetric all extremities. Skin: Skin color, texture, turgor normal. No rashes or lesions   Neurologic: CNII-XII intact. RUE . Data Review:       Recent Days:  No results for input(s): WBC, HGB, HCT, PLT, HGBEXT, HCTEXT, PLTEXT, HGBEXT, HCTEXT, PLTEXT in the last 72 hours. No results for input(s): NA, K, CL, CO2, GLU, BUN, CREA, CA, MG, PHOS, ALB, TBIL, TBILI, ALT, INR, INREXT, INREXT in the last 72 hours. No lab exists for component: SGOT    No results for input(s): PH, PCO2, PO2, HCO3, FIO2 in the last 72 hours. 24 Hour Results:  No results found for this or any previous visit (from the past 24 hour(s)).             Assessment/     Acute ischemic stroke left corona radiata with right hemiplegia, arm worse than leg    Uncontrolled hypertension, improved  Continue current antihypertensives    Ambulatory and gait disturbance    -Due to acute CVA    -Will   need inpatient rehabilitation    Tobacco abuse    Hyperlipidemia      Plan:  Continue high intensity statin and aspirin  Continue antihypertensives    Plan is for discharge to [facility] next week    Care Plan discussed with: Patient/Family    Total time spent with patient: 30 minutes.     Gianna Bell MD

## 2022-09-04 NOTE — PROGRESS NOTES
PHYSICAL THERAPY TREATMENT  Patient: Brianna Tay (84 y.o. female)  Date: 9/4/2022  Diagnosis: CVA (cerebral vascular accident) ([facility] [I63.9] <principal problem not specified>      Precautions:    Chart, physical therapy assessment, plan of care and goals were reviewed. ASSESSMENT  Patient continues with skilled PT services and is progressing towards goals. Received semi supine in bed upon arrival and agreeable to therapy session. Bed mobility and transfer performed with SBA. Sit to stand performed at [facility]. Ambulation performed with ana maria walker for 150' with CGA. TE performed with seated AAROM Shoulder flexion, ap, laq, march, hip abd, sit to stand. Exhibits mild RLE Fatigue. At this time still has no use of RUE. Recommend DC to IRF. Current Level of Function Impacting Discharge (mobility/balance): PMH    Other factors to consider for discharge: PMH         PLAN :  Patient continues to benefit from skilled intervention to address the above impairments. Continue treatment per established plan of care. to address goals. Recommendation for discharge: (in order for the patient to meet his/her long term goals)  [facility],[address]     This discharge recommendation:  Has been made in collaboration with the attending provider and/or case management    IF patient discharges home will need the following DME: ANA MARIA WALKER       SUBJECTIVE:   Patient stated IM DOING ALRIGHT.     OBJECTIVE DATA SUMMARY:   Critical Behavior:  Neurologic State: Alert  Orientation Level: Oriented X4  Cognition: Follows commands  Safety/Judgement: Decreased insight into deficits  Functional Mobility Training:  Bed Mobility:  Rolling: Stand-by assistance  Supine to Sit: Stand-by assistance  Sit to Supine: Stand-by assistance  Scooting: Stand-by assistance        Transfers:  Sit to Stand: Contact guard assistance  Stand to Sit: Contact guard assistance                             Balance:  Sitting: Impaired; With support  Sitting - Static: Good (unsupported)  Sitting - Dynamic: Fair (occasional)  Standing: Impaired; With support  Standing - Static: Constant support; Fair  Standing - Dynamic : Constant support; Fair  Ambulation/Gait Training:  Distance (ft): 150 Feet (ft)  Assistive Device: Walker goldie;Gait belt  Ambulation - Level of Assistance: Contact guard assistance                 Base of Support: Narrowed                             Stairs: Therapeutic Exercises:   Therapeutic Exercises:       EXERCISE   Sets   Reps   Active Active Assist   Passive Self ROM   Comments   Ankle Pumps  3MIN [x] [] [] []    Quad Sets/Glut Sets   [] [] [] []    Hamstring Sets   [] [] [] []    Short Arc Quads   [] [] [] []    Heel Slides   [] [] [] []    Straight Leg Raises   [] [] [] []    Hip abd/add  3MIN [x] [] [] []    Long Arc Quads  3MIN [x] [] [] []    Marching  3MIN [x] [] [] []    AAROM SHOULDER FLEXION+SIT TO STAND  3MIN/3MIN [x] [] [] []        Pain Ratin    Activity Tolerance:   Bed locked and in lowest position Fair  Please refer to the flowsheet for vital signs taken during this treatment. After treatment patient left in no apparent distress:   Bed returned to lowest position, Supine in bed    COMMUNICATION/COLLABORATION:   The patients plan of care was discussed with: Physical therapy assistant.      Jatinder Encarnacion PTA   Time Calculation: 38 mins

## 2022-09-05 PROCEDURE — 97110 THERAPEUTIC EXERCISES: CPT

## 2022-09-05 PROCEDURE — 97530 THERAPEUTIC ACTIVITIES: CPT

## 2022-09-05 PROCEDURE — 97116 GAIT TRAINING THERAPY: CPT

## 2022-09-05 PROCEDURE — 74011250637 HC RX REV CODE- 250/637: Performed by: INTERNAL MEDICINE

## 2022-09-05 PROCEDURE — 74011250636 HC RX REV CODE- 250/636: Performed by: INTERNAL MEDICINE

## 2022-09-05 PROCEDURE — 65270000029 HC RM PRIVATE

## 2022-09-05 RX ADMIN — AMLODIPINE BESYLATE 10 MG: 5 TABLET ORAL at 08:33

## 2022-09-05 RX ADMIN — LISINOPRIL 20 MG: 20 TABLET ORAL at 08:33

## 2022-09-05 RX ADMIN — DOCUSATE SODIUM 200 MG: 100 CAPSULE, LIQUID FILLED ORAL at 08:33

## 2022-09-05 RX ADMIN — ASPIRIN 81 MG CHEWABLE TABLET 81 MG: 81 TABLET CHEWABLE at 08:33

## 2022-09-05 RX ADMIN — ATORVASTATIN CALCIUM 80 MG: 40 TABLET, FILM COATED ORAL at 21:01

## 2022-09-05 RX ADMIN — ENOXAPARIN SODIUM 40 MG: 100 INJECTION SUBCUTANEOUS at 08:33

## 2022-09-05 NOTE — PROGRESS NOTES
Problem: Self Care Deficits Care Plan (Adult)  Goal: *Acute Goals and Plan of Care (Insert Text)  Description: Pt will be IND self feeding  Pt will be IND sup<->sit in prep for EOB ADL's  Pt will be IND LB dressing EOB level  Pt will be IND sit EOB 10 minutes in prep for EOB ADL's  Pt will be IND sit<-> prep for toilet transfer  Pt will be IND BSC/toilet transfer with LRAD  Pt will be IND toileting/cloth mgmt LRAD  Pt will be IND grooming standing sink  Pt will be IND bathing sitting/standing sink LRAD  Pt will be MI sulema UE HEP in prep for self care tasks    Outcome: Progressing Towards Goal   OCCUPATIONAL THERAPY TREATMENT  Patient: Shellie George (32 y.o. female)  Date: 9/5/2022  Diagnosis: CVA (cerebral vascular accident) ([facility] [I63.9] <principal problem not specified>      Precautions:    Chart, occupational therapy assessment, plan of care, and goals were reviewed. ASSESSMENT  Patient continues with skilled OT services and is progressing towards goals. Patient received seated EOB upon MONTIEL'S arrival and agreeable to work with therapist. Pt is currently CGA for all functional tf's (CGA STS, toilet tf and sink level tf) for steadying and vc's for correct hand placement and goldie walker mgmt for safety. Pt unable to void. Standing at sink, pt washed hands with CGA. No LOB noted. Pt returned to chair for combing hair with set-up. Seated, patient re-educated on and completed SROM UE exercises ROM and strength for OOB ADL'S, see grid below. Pt req'd set-up for eating breakfast with assist with opening containers and cutting food d/t R UE goldie. Pt continues to present no movement in R UE at this time. Pt left resting in chair with call bell,bed alarm and all needs met. Patient would benefit from continued skilled Occupational services while at [facility] in order to increase safety and independence with self care and functional tranfers/mobility. Recommend at discharge to IRF when medically appropriate. Current Level of Function Impacting Discharge (ADLs): CGA/set-up for grooming    Other factors to consider for discharge: Time of onset, medical prognosis/diagnosis, severity of deficits, PLOF, functional baseline, home environment, and family support          PLAN :  Patient continues to benefit from skilled intervention to address the above impairments. Continue treatment per established plan of care. to address goals. Recommend with staff: chair level grooming    Recommend next OT session: Sink level grooming    Recommendation for discharge: (in order for the patient to meet his/her long term goals)  Inpatient Rehabilitation Facility     This discharge recommendation:  Has been made in collaboration with the attending provider and/or case management    IF patient discharges home will need the following DME: TBD       SUBJECTIVE:   Patient stated I still can't move it.     OBJECTIVE DATA SUMMARY:   Cognitive/Behavioral Status:  Neurologic State: Alert  Orientation Level: Oriented X4  Cognition: Follows commands             Functional Mobility and Transfers for ADLs:  Bed Mobility:       Transfers:  Sit to Stand: Contact guard assistance  Functional Transfers  Bathroom Mobility: Contact guard assistance  Toilet Transfer : Contact guard assistance       Balance:  Sitting: Intact  Sitting - Static: Good (unsupported)  Sitting - Dynamic: Good (unsupported)  Standing: Impaired; With support  Standing - Static: Constant support;Good  Standing - Dynamic : Constant support; Fair    ADL Intervention:  Feeding  Feeding Assistance: Set-up  Container Management: Set-up  Cutting Food: Set-up  Utensil Management: Independent  Food to Mouth:  Independent    Grooming  Grooming Assistance: Contact guard assistance;Set-up  Position Performed: Standing;Seated in chair  Washing Hands: Contact guard assistance  Brushing/Combing Hair: Set-up       Therapeutic Exercises:   Exercise Sets Reps AROM AAROM PROM Self PROM Comments Shoulder flex/ext 1 10 [] [] [] [x] Seated in chair, vc's pace self and joint protection   Elbow flex/ext 1 20 [] [] [] [x]    S/P 1 10 [] [] [] []         Pain:  0/10    Activity Tolerance:   Good  Please refer to the flowsheet for vital signs taken during this treatment. After treatment patient left in no apparent distress:   Bed locked and in lowest position, Sitting in chair, Call bell within reach, and Bed / chair alarm activated    COMMUNICATION/COLLABORATION:   The patients plan of care was discussed with: Registered nurse.      TIANA Loera  Time Calculation: 24 mins

## 2022-09-05 NOTE — PROGRESS NOTES
Problem: Mobility Impaired (Adult and Pediatric)  Goal: *Acute Goals and Plan of Care (Insert Text)  Description: Patient stated goal: feel better  Patient will move from supine to sit and sit to supine , scoot up and down, and roll side to side in bed with MOD I within 7 day(s). Patient will transfer from bed to chair and chair to bed with supervision using the least restrictive device within 7 day(s). Patient will improve static standing balance to supervision within 1 week(s). Patient will ambulate 200 feet with SBA with least restrictive device within 1 weeks. Outcome: Progressing Towards Goal   PHYSICAL THERAPY TREATMENT  Patient: Jovi Penn (31 y.o. female)  Date: 9/5/2022  Diagnosis: CVA (cerebral vascular accident) ([facility] [I63.9] <principal problem not specified>      Precautions:    Chart, physical therapy assessment, plan of care and goals were reviewed. ASSESSMENT  Pt seen for PT reassessment today. Pt has been seen for 7 PT visits since evaluation on 8/27/22. Per evaluation on 8/27/22: \"As per Dr Rosalia Francisco notes from 8/27/2022 at 1144(The patient is seen for follow  up.  70-year-old with history of essential hypertension and chronic tobacco use admitted for sudden onset right-sided weakness. Admitted for acute CVA work-up. MRI brain showed acute left corona radiata infarction.). PMH: As below. Pt was indep  for ADLS/IADLS, no AD with mobility prior to admission. Patient exhibits rt side weakness. \"      Patient continues with skilled PT services and is progressing towards goals. Pt received sitting in bedside chair. Pt required assistance to don sling. Pt demonstrating improvement with mobility, requiring CGA for sit to stand, and showing improvement in gait pattern, steadier on her feet with only 1 LOB when changing direction (pt's legs scissored.)  Pt ambulates with hemiwalker and step to gait pattern and slow contreras and narrow LOREN.   Required cues to increase step length and widen LOREN and to work on picking up her R foot due to decreased foot clearance on the R. Pt also exhibits slight buckling in R knee and then hyperextension when taking a step. Pt assisted back to bedside chair for LE exercises requiring cues for proper technique. Pt would benefit from continued skilled PT services to improve LE strength (specifically R hip extensors/R quad), improve balance, overall functional mobility and gait to prevent falls and help pt return to PLOF. Recommend d/c to IRF upon discharge. Other factors to consider for discharge: impaired mobility, level of assist         PLAN :  Patient continues to benefit from skilled intervention to address the above impairments. Continue treatment per established plan of care. to address goals. Recommendation for discharge: (in order for the patient to meet his/her long term goals)  [facility]     This discharge recommendation:  Has been made in collaboration with the attending provider and/or case management    IF patient discharges home will need the following DME: to be determined (TBD)       SUBJECTIVE:   Patient stated I feel good.     OBJECTIVE DATA SUMMARY:   Critical Behavior:  Neurologic State: Alert  Orientation Level: Oriented X4  Cognition: Follows commands  Safety/Judgement: Decreased insight into deficits  Functional Mobility Training:  Bed Mobility:                    Transfers:  Sit to Stand: Contact guard assistance  Stand to Sit: Contact guard assistance                             Balance:  Sitting: Intact  Sitting - Static: Good (unsupported)  Sitting - Dynamic: Good (unsupported)  Standing: Impaired; With support  Standing - Static: Constant support;Good  Standing - Dynamic : Constant support; Fair  Ambulation/Gait Training:  Distance (ft): 150 Feet (ft)  Assistive Device: Gait belt;Walker goldie  Ambulation - Level of Assistance: Contact guard assistance        Gait Abnormalities: Step to gait;Steppage gait; Decreased step clearance                          Functional measures:  [address] [place] [address] AM-PAC 6 Clicks         Basic Mobility Inpatient Short Form  How much difficulty does the patient currently have. .. Unable A Lot A Little None   1. Turning over in bed (including adjusting bedclothes, sheets and blankets)? [] 1   [] 2   [] 3   [x] 4   2. Sitting down on and standing up from a chair with arms ( e.g., wheelchair, bedside commode, etc.)   [] 1   [] 2   [x] 3   [] 4   3. Moving from lying on back to sitting on the side of the bed? [] 1   [] 2   [] 3   [x] 4          How much help from another person does the patient currently need. .. Total A Lot A Little None   4. Moving to and from a bed to a chair (including a wheelchair)? [] 1   [] 2   [x] 3   [] 4   5. Need to walk in hospital room? [] 1   [] 2   [x] 3   [] 4   6. Climbing 3-5 steps with a railing? [] 1   [x] 2   [] 3   [] 4   © , Trustees of [address] [place] [address], under license to [employer]. All rights reserved     Score:  Initial: 19 Most Recent: X (Date: 22)   Interpretation of Tool:  Represents activities that are increasingly more difficult (i.e. Bed mobility, Transfers, Gait). Score 24 23 22-20 19-15 14-10 9-7 6   Modifier CH CI CJ CK CL CM CN                     Therapeutic Exercises:   Seated marches, LAQ, ankle pumps, hip abd/add x 15 each/sit to stands x 5 reps with decreased HHA for standing, standing hip abd x 15 each leg with decreased HHA, working on standing balance. Pain Ratin/10    Activity Tolerance:   Bed locked and in lowest position Good  Please refer to the flowsheet for vital signs taken during this treatment. After treatment patient left in no apparent distress:   Bed returned to lowest position, Sitting in chair, Call bell within reach, and Caregiver / family present    COMMUNICATION/COLLABORATION:   The patients plan of care was discussed with: Registered nurse.      Capri Ram Peyton   Time Calculation: 30 mins

## 2022-09-05 NOTE — PROGRESS NOTES
Hospitalist Progress Note         Radha Zacarias MD          Daily Progress Note: 9/5/2022      Subjective: The patient is seen for follow  up.  61-year-old with history of essential hypertension and chronic tobacco use admitted for sudden onset right-sided weakness. Admitted for acute CVA work-up. MRI brain showed acute left corona radiata infarction. Unfortunately, patient does not have any health insurance which is making rehab referral problematic    She has been accepted for renate bed at [address] [place] [address] but they will not have a bed until Tuesday or Wednesday  ---  She is seen in follow-up. She is doing well. Right-sided weakness unchanged although patient feels like it is slowly getting better.     Problem List:  Problem List as of 9/5/2022 Never Reviewed            Codes Class Noted - Resolved    CVA (cerebral vascular accident) [facility]) ICD-10-CM: I63.9  ICD-9-CM: 434.91  8/26/2022 - Present       Medications reviewed  Current Facility-Administered Medications   Medication Dose Route Frequency    docusate sodium (COLACE) capsule 200 mg  200 mg Oral DAILY    polyethylene glycol (MIRALAX) packet 17 g  17 g Oral PRN    atorvastatin (LIPITOR) tablet 80 mg  80 mg Oral QHS    lisinopriL (PRINIVIL, ZESTRIL) tablet 20 mg  20 mg Oral DAILY    acetaminophen (TYLENOL) tablet 650 mg  650 mg Oral Q4H PRN    Or    acetaminophen (TYLENOL) solution 650 mg  650 mg Per NG tube Q4H PRN    Or    acetaminophen (TYLENOL) suppository 650 mg  650 mg Rectal Q4H PRN    aspirin chewable tablet 81 mg  81 mg Oral DAILY    enoxaparin (LOVENOX) injection 40 mg  40 mg SubCUTAneous Q24H    amLODIPine (NORVASC) tablet 10 mg  10 mg Oral DAILY    hydrALAZINE (APRESOLINE) 20 mg/mL injection 10 mg  10 mg IntraVENous Q6H PRN    nicotine (NICODERM CQ) 14 mg/24 hr patch 1 Patch  1 Patch TransDERmal DAILY       Review of Systems:   A comprehensive review of systems was negative except for that written in the HPI.    Objective:   Physical Exam:     Visit Vitals  /82 (BP 1 Location: Left upper arm, BP Patient Position: At rest;Lying)   Pulse 77   Temp 98.4 °F (36.9 °C)   Resp 18   Ht 5' 6\" (1.676 m)   Wt 73.9 kg (163 lb)   SpO2 100%   BMI 26.31 kg/m²      O2 Device: None (Room air)    Temp (24hrs), Av.3 °F (36.8 °C), Min:98 °F (36.7 °C), Max:98.5 °F (36.9 °C)    No intake/output data recorded.  1901 -  0700  In: -   Out: 2 [Urine:2]    General:  Alert, cooperative, no distress, appears stated age. Lungs:   Clear to auscultation bilaterally. Chest wall:  No tenderness or deformity. Heart:  Regular rate and rhythm, S1, S2 normal, no murmur, click, rub or gallop. Abdomen:   Soft, non-tender. Bowel sounds normal. No masses,  No organomegaly. Extremities: Extremities normal, atraumatic, no cyanosis or edema. Pulses: 2+ and symmetric all extremities. Skin: Skin color, texture, turgor normal. No rashes or lesions   Neurologic: CNII-XII intact. RUE . Data Review:       Recent Days:  No results for input(s): WBC, HGB, HCT, PLT, HGBEXT, HCTEXT, PLTEXT, HGBEXT, HCTEXT, PLTEXT in the last 72 hours. No results for input(s): NA, K, CL, CO2, GLU, BUN, CREA, CA, MG, PHOS, ALB, TBIL, TBILI, ALT, INR, INREXT, INREXT in the last 72 hours. No lab exists for component: SGOT    No results for input(s): PH, PCO2, PO2, HCO3, FIO2 in the last 72 hours. 24 Hour Results:  No results found for this or any previous visit (from the past 24 hour(s)).             Assessment/     Acute ischemic stroke left corona radiata with right hemiplegia, arm worse than leg    Uncontrolled hypertension, improved  Continue current antihypertensives    Ambulatory and gait disturbance    -Due to acute CVA    -Will   need inpatient rehabilitation    Tobacco abuse    Hyperlipidemia      Plan:  Continue high intensity statin and aspirin  Continue antihypertensives    Plan is for discharge to [facility] next week    Care Plan discussed with: Patient/Family    Total time spent with patient: 30 minutes.     Jay Jay Agarwal MD

## 2022-09-05 NOTE — PROGRESS NOTES
Problem: Falls - Risk of  Goal: *Absence of Falls  Description: Document Pedro Guadarrama Fall Risk and appropriate interventions in the flowsheet.   Outcome: Not Met  Note: Fall Risk Interventions:  Mobility Interventions: PT Consult for mobility concerns         Medication Interventions: Assess postural VS orthostatic hypotension    Elimination Interventions: Call light in reach

## 2022-09-06 LAB
FLUAV AG NPH QL IA: NEGATIVE
FLUBV AG NOSE QL IA: NEGATIVE
SARS-COV-2, COV2: NORMAL

## 2022-09-06 PROCEDURE — 74011250636 HC RX REV CODE- 250/636: Performed by: INTERNAL MEDICINE

## 2022-09-06 PROCEDURE — 74011250637 HC RX REV CODE- 250/637: Performed by: INTERNAL MEDICINE

## 2022-09-06 PROCEDURE — 97110 THERAPEUTIC EXERCISES: CPT

## 2022-09-06 PROCEDURE — 65270000029 HC RM PRIVATE

## 2022-09-06 PROCEDURE — U0005 INFEC AGEN DETEC AMPLI PROBE: HCPCS

## 2022-09-06 PROCEDURE — 87804 INFLUENZA ASSAY W/OPTIC: CPT

## 2022-09-06 PROCEDURE — 97116 GAIT TRAINING THERAPY: CPT

## 2022-09-06 RX ADMIN — LISINOPRIL 20 MG: 20 TABLET ORAL at 09:06

## 2022-09-06 RX ADMIN — AMLODIPINE BESYLATE 10 MG: 5 TABLET ORAL at 09:06

## 2022-09-06 RX ADMIN — ENOXAPARIN SODIUM 40 MG: 100 INJECTION SUBCUTANEOUS at 09:06

## 2022-09-06 RX ADMIN — ATORVASTATIN CALCIUM 80 MG: 40 TABLET, FILM COATED ORAL at 21:19

## 2022-09-06 RX ADMIN — ASPIRIN 81 MG CHEWABLE TABLET 81 MG: 81 TABLET CHEWABLE at 09:06

## 2022-09-06 RX ADMIN — DOCUSATE SODIUM 200 MG: 100 CAPSULE, LIQUID FILLED ORAL at 09:06

## 2022-09-06 NOTE — PROGRESS NOTES
Problem: Falls - Risk of  Goal: *Absence of Falls  Description: Document Macey Papa Fall Risk and appropriate interventions in the flowsheet.   Outcome: Not Met  Note: Fall Risk Interventions:  Mobility Interventions: PT Consult for mobility concerns         Medication Interventions: Bed/chair exit alarm    Elimination Interventions: Call light in reach    History of Falls Interventions: Bed/chair exit alarm

## 2022-09-06 NOTE — PROGRESS NOTES
Hospitalist Progress Note         Rupali Paz MD          Daily Progress Note: 9/6/2022      Subjective: The patient is seen for follow  up.  57-year-old with history of essential hypertension and chronic tobacco use admitted for sudden onset right-sided weakness. Admitted for acute CVA work-up. MRI brain showed acute left corona radiata infarction. Unfortunately, patient does not have any health insurance which is making rehab referral problematic    She has been accepted for renate bed at [address] [place] [address] but they will not have a bed until Tuesday or Wednesday  ---  She is seen in follow-up. She is doing well. Right-sided weakness unchanged although patient feels like it is slowly getting better.     Problem List:  Problem List as of 9/6/2022 Never Reviewed            Codes Class Noted - Resolved    CVA (cerebral vascular accident) [facility]) ICD-10-CM: I63.9  ICD-9-CM: 434.91  8/26/2022 - Present       Medications reviewed  Current Facility-Administered Medications   Medication Dose Route Frequency    docusate sodium (COLACE) capsule 200 mg  200 mg Oral DAILY    polyethylene glycol (MIRALAX) packet 17 g  17 g Oral PRN    atorvastatin (LIPITOR) tablet 80 mg  80 mg Oral QHS    lisinopriL (PRINIVIL, ZESTRIL) tablet 20 mg  20 mg Oral DAILY    acetaminophen (TYLENOL) tablet 650 mg  650 mg Oral Q4H PRN    Or    acetaminophen (TYLENOL) solution 650 mg  650 mg Per NG tube Q4H PRN    Or    acetaminophen (TYLENOL) suppository 650 mg  650 mg Rectal Q4H PRN    aspirin chewable tablet 81 mg  81 mg Oral DAILY    enoxaparin (LOVENOX) injection 40 mg  40 mg SubCUTAneous Q24H    amLODIPine (NORVASC) tablet 10 mg  10 mg Oral DAILY    hydrALAZINE (APRESOLINE) 20 mg/mL injection 10 mg  10 mg IntraVENous Q6H PRN    nicotine (NICODERM CQ) 14 mg/24 hr patch 1 Patch  1 Patch TransDERmal DAILY       Review of Systems:   A comprehensive review of systems was negative except for that written in the HPI.    Objective:   Physical Exam:     Visit Vitals  /82 (BP 1 Location: Left upper arm, BP Patient Position: At rest)   Pulse 73   Temp 98 °F (36.7 °C)   Resp 18   Ht 5' 6\" (1.676 m)   Wt 73.9 kg (163 lb)   SpO2 100%   BMI 26.31 kg/m²      O2 Device: None (Room air)    Temp (24hrs), Av.9 °F (36.6 °C), Min:97.7 °F (36.5 °C), Max:98.1 °F (36.7 °C)    No intake/output data recorded. No intake/output data recorded. General:  Alert, cooperative, no distress, appears stated age. Lungs:   Clear to auscultation bilaterally. Chest wall:  No tenderness or deformity. Heart:  Regular rate and rhythm, S1, S2 normal, no murmur, click, rub or gallop. Abdomen:   Soft, non-tender. Bowel sounds normal. No masses,  No organomegaly. Extremities: Extremities normal, atraumatic, no cyanosis or edema. Pulses: 2+ and symmetric all extremities. Skin: Skin color, texture, turgor normal. No rashes or lesions   Neurologic: CNII-XII intact. RUE . Data Review:       Recent Days:  No results for input(s): WBC, HGB, HCT, PLT, HGBEXT, HCTEXT, PLTEXT, HGBEXT, HCTEXT, PLTEXT in the last 72 hours. No results for input(s): NA, K, CL, CO2, GLU, BUN, CREA, CA, MG, PHOS, ALB, TBIL, TBILI, ALT, INR, INREXT, INREXT in the last 72 hours. No lab exists for component: SGOT    No results for input(s): PH, PCO2, PO2, HCO3, FIO2 in the last 72 hours. 24 Hour Results:  No results found for this or any previous visit (from the past 24 hour(s)).             Assessment/     Acute ischemic stroke left corona radiata with right hemiplegia, arm worse than leg    Uncontrolled hypertension, improved  Continue current antihypertensives    Ambulatory and gait disturbance    -Due to acute CVA    -Will   need inpatient rehabilitation    Tobacco abuse    Hyperlipidemia      Plan:  Continue high intensity statin and aspirin  Continue antihypertensives    Plan is for discharge to [facility] next week    Care Plan discussed with: Patient/Family    Total time spent with patient: 30 minutes.     Ike Peter MD

## 2022-09-06 NOTE — PROGRESS NOTES
PHYSICAL THERAPY TREATMENT  Patient: Malaika Mitchell (89 y.o. female)  Date: 9/6/2022  Diagnosis: [facility] [I63.9] <principal problem not specified>      Precautions:    Chart, physical therapy assessment, plan of care and goals were reviewed. ASSESSMENT  Patient continues with skilled PT services and is progressing towards goals. Pt found supine in bed upon arrival and agreeable to work with therapy. Pt scooted and supine <> sit SBA to EOB. Assisted pt in donning sling on RUE, required Min A. Pt sit <> stand CGA with HW and ambulated Min A in the hallway approximately 150ft with a steppage gait, hyper extension of R knee, and LOB x1 Min A to help correct balance. Pt sat EOB upon return to the room and performed sitting therex, see chart below. Pt demonstrated adjusted RUE sling and educated on not letting RUE hang to prevent further subluxation. Pt left supine in bed and visitors entering room. Current Level of Function Impacting Discharge (mobility/balance): CGA/Min A for mobility    Other factors to consider for discharge: PLOF, safety, decreased mobility         PLAN :  Patient continues to benefit from skilled intervention to address the above impairments. Continue treatment per established plan of care. to address goals. Recommendation for discharge: (in order for the patient to meet his/her long term goals)  [address]     This discharge recommendation:  Has been made in collaboration with the attending provider and/or case management    IF patient discharges home will need the following DME: to be determined (TBD) and Jose walker       SUBJECTIVE:   Patient stated Yeah, let's walk.     OBJECTIVE DATA SUMMARY:   Critical Behavior:  Neurologic State: Alert  Orientation Level: Oriented X4  Cognition: Follows commands  Safety/Judgement: Decreased insight into deficits  Functional Mobility Training:    Bed Mobility:  Rolling: Stand-by assistance  Supine to Sit: Stand-by assistance  Sit to Supine: Stand-by assistance  Scooting: Stand-by assistance    Transfers:  Sit to Stand: Contact guard assistance  Stand to Sit: Contact guard assistance    Balance:  Sitting: Intact; With support  Sitting - Static: Good (unsupported)  Sitting - Dynamic: Fair (occasional)  Standing: Impaired; With support  Standing - Static: Good;Constant support  Standing - Dynamic : Fair;Constant support    Ambulation/Gait Training:  Distance (ft): 150 Feet (ft)  Assistive Device: Gait belt;Walker goldie  Ambulation - Level of Assistance: Minimal assistance  Gait Abnormalities: Steppage gait; Step to gait      Therapeutic Exercises:       EXERCISE   Sets   Reps   Active Active Assist   Passive Self ROM   Comments   Ankle Pumps  12 [x] [] [] []    Hip abd/add  12 [x] [] [] []    Long Arc Quads  15 [x] [] [] []    Marching  15 [x] [] [] []       Pain Ratin/10    Activity Tolerance:   Good  Please refer to the flowsheet for vital signs taken during this treatment. After treatment patient left in no apparent distress:   Bed returned to lowest position, Supine in bed, Call bell within reach, Caregiver / family present, and Side rails x 3    COMMUNICATION/COLLABORATION:   The patients plan of care was discussed with: Registered nurse. HOLDEN Thompson   Time Calculation: 28 mins    KASANDRA Thompson, under direct supervision of Danay Pope PTA provided care and treatment of pt with verbal consent from pt received. Problem: Mobility Impaired (Adult and Pediatric)  Goal: *Acute Goals and Plan of Care (Insert Text)  Description: Patient stated goal: feel better  Patient will move from supine to sit and sit to supine , scoot up and down, and roll side to side in bed with MOD I within 7 day(s). Patient will transfer from bed to chair and chair to bed with supervision using the least restrictive device within 7 day(s).     Patient will improve static standing balance to supervision within 1 week(s). Patient will ambulate 200 feet with SBA with least restrictive device within 1 weeks.      Outcome: Progressing Towards Goal

## 2022-09-07 LAB
SARS-COV-2, XPLCVT: NOT DETECTED
SOURCE, COVRS: NORMAL

## 2022-09-07 PROCEDURE — 65270000029 HC RM PRIVATE

## 2022-09-07 PROCEDURE — 74011250637 HC RX REV CODE- 250/637: Performed by: INTERNAL MEDICINE

## 2022-09-07 PROCEDURE — 97116 GAIT TRAINING THERAPY: CPT

## 2022-09-07 PROCEDURE — 97110 THERAPEUTIC EXERCISES: CPT

## 2022-09-07 PROCEDURE — 74011250636 HC RX REV CODE- 250/636: Performed by: INTERNAL MEDICINE

## 2022-09-07 RX ADMIN — ASPIRIN 81 MG CHEWABLE TABLET 81 MG: 81 TABLET CHEWABLE at 09:49

## 2022-09-07 RX ADMIN — ENOXAPARIN SODIUM 40 MG: 100 INJECTION SUBCUTANEOUS at 09:49

## 2022-09-07 RX ADMIN — LISINOPRIL 20 MG: 20 TABLET ORAL at 09:50

## 2022-09-07 RX ADMIN — ATORVASTATIN CALCIUM 80 MG: 40 TABLET, FILM COATED ORAL at 22:37

## 2022-09-07 RX ADMIN — AMLODIPINE BESYLATE 10 MG: 5 TABLET ORAL at 09:49

## 2022-09-07 NOTE — PROGRESS NOTES
PHYSICAL THERAPY TREATMENT  Patient: Jose Velasco (67 y.o. female)  Date: 9/7/2022  Diagnosis: [facility]cere[facility] vascular [facility]) [facility] [I63.9] <principal problem not specified>      Precautions:    Chart, physical therapy assessment, plan of care and goals were reviewed. ASSESSMENT  Patient continues with skilled PT services and is progressing towards goals. Pt found supine in bed upon arrival and agreeable to therapy. Pt supine > sit SBA to EOB and sit <> stand CGA with HW and ambulated CGA in the hallway approximately 150ft with a slow, steppage and step to gait pattern, continues to demonstrate hyperextension of R knee while ambulating, no LOB noted. Pt returned to room and sat in bedside chair to perform therex, see chart below. Pt left sitting up in bedside chair with call bell within reach. Pt seems unaware of RUE sliding out of sling and needs vcs to adjust RUE prior to session. Current Level of Function Impacting Discharge (mobility/balance): SBA/CGA for mobility    Other factors to consider for discharge: PLOF, decreased mobility         PLAN :  Patient continues to benefit from skilled intervention to address the above impairments. Continue treatment per established plan of care. to address goals. Recommendation for discharge: (in order for the patient to meet his/her long term goals)  [address]     This discharge recommendation:  Has been made in collaboration with the attending provider and/or case management    IF patient discharges home will need the following DME: to be determined (TBD), Hemiwalker       SUBJECTIVE:   Patient stated I think my leg is getting stronger.     OBJECTIVE DATA SUMMARY:   Critical Behavior:  Neurologic State: Alert  Orientation Level: Oriented X4  Cognition: Follows commands  Safety/Judgement: Decreased insight into deficits    Functional Mobility Training:    Bed Mobility:  Rolling: Stand-by assistance  Supine to Sit: Stand-by assistance  Scooting: Stand-by assistance    Transfers:  Sit to Stand: Contact guard assistance  Stand to Sit: Contact guard assistance    Balance:  Sitting: Intact; With support  Sitting - Static: Good (unsupported)  Sitting - Dynamic: Fair (occasional)  Standing: Impaired; With support  Standing - Static: Good;Constant support  Standing - Dynamic : Fair;Constant support    Ambulation/Gait Training:  Distance (ft): 150 Feet (ft)  Assistive Device: Walker goldie;Gait belt  Ambulation - Level of Assistance: Contact guard assistance  Gait Abnormalities: Steppage gait; Step to gait      Therapeutic Exercises:       EXERCISE   Sets   Reps   Active Active Assist   Passive Self ROM   Comments   Ankle Pumps  20 [x] [] [] []    Hip abd/add  20 [x] [] [] []    Long Arc Quads  20 [x] [] [] []    Marching  20 [x] [] [] []       [] [] [] []       Pain Ratin/10    Activity Tolerance:   Good  Please refer to the flowsheet for vital signs taken during this treatment. After treatment patient left in no apparent distress:   Sitting in chair and Call bell within reach    COMMUNICATION/COLLABORATION:   The patients plan of care was discussed with: Registered nurse. HOLDEN Gunter   Time Calculation: 25 mins    KASANDRA Gunter, under direct supervision of Marcelle Dhaliwal PTA provided care and treatment of pt with verbal consent from pt received. Problem: Mobility Impaired (Adult and Pediatric)  Goal: *Acute Goals and Plan of Care (Insert Text)  Description: Patient stated goal: feel better  Patient will move from supine to sit and sit to supine , scoot up and down, and roll side to side in bed with MOD I within 7 day(s). Patient will transfer from bed to chair and chair to bed with supervision using the least restrictive device within 7 day(s). Patient will improve static standing balance to supervision within 1 week(s). Patient will ambulate 200 feet with SBA with least restrictive device within 1 weeks. Outcome: Progressing Towards Goal

## 2022-09-07 NOTE — DISCHARGE INSTRUCTIONS
INSTRUCTIONS ON DISCHARGE     Please note that the new medications added to your regimen are:             AMLODIPINE 10 mg daily              ASPIRIN 81 mg daily              ATORVASTATIN 80 mg daily              LISINOPRIL 20 mg daily

## 2022-09-07 NOTE — PROGRESS NOTES
Problem: Pressure Injury - Risk of  Goal: *Prevention of pressure injury  Description: Document Jose Antonio Scale and appropriate interventions in the flowsheet.   Outcome: Progressing Towards Goal  Note: Pressure Injury Interventions:  Sensory Interventions: Assess need for specialty bed    Moisture Interventions: Absorbent underpads    Activity Interventions: Assess need for specialty bed    Mobility Interventions: HOB 30 degrees or less    Nutrition Interventions: Offer support with meals,snacks and hydration    Friction and Shear Interventions: Apply protective barrier, creams and emollients                Problem: Patient Education: Go to Patient Education Activity  Goal: Patient/Family Education  Outcome: Progressing Towards Goal

## 2022-09-07 NOTE — PROGRESS NOTES
10:43am   [facility] has been accepted to [facility] and they can accept today pending the covid PCR results. Results are still pending at this time. 1:34pm  Covid PCR is still pending at this time.

## 2022-09-07 NOTE — PROGRESS NOTES
Nutrition Assessment     Type and Reason for Visit: Reassess (Early Goal)    Nutrition Recommendations/Plan:   Continue diet. Monitor while IP. Nutrition Assessment:   Admitted for acute CVA and right-sided weakness. Good appetite reported w/ good intake- ~75% of Breakfast reported. Pt had no nutrition concerns. Continue diet. (9/7) Good intake reported- >/=75% of meals. Continue diet. Labs unremarkable. Meds: lisinopril, lipitor, norvasc. Malnutrition Assessment:  Malnutrition Status: No malnutrition     Estimated Daily Nutrient Needs:  Energy (kcal):  1850 kcal/d  Protein (g):  82 gm/d       Fluid (ml/day):  1900 mL/d    Nutrition Related Findings:  No N/V/D/C nor c/s issues reported. SLP tx continues. Last BM 9/5. No edema. Current Nutrition Therapies:  ADULT DIET Regular; Low Sodium (2 gm)    Anthropometric Measures:  Height:  5' 6\" (167.6 cm)  Current Body Wt:  73.9 kg (162 lb 14.7 oz) (8/27)  BMI: 26.3    Nutrition Diagnosis:   No nutrition diagnosis at this time    Nutrition Interventions:   Food and/or Nutrient Delivery: Continue current diet  Nutrition Education/Counseling: No recommendations at this time  Coordination of Nutrition Care: Continue to monitor while inpatient  Plan of Care discussed with: RN, Pt    Goals:  Previous Goal Met: Goal(s) achieved  Goals: Meet at least 75% of estimated needs, PO intake 75% or greater, by next RD assessment    Nutrition Monitoring and Evaluation:   Behavioral-Environmental Outcomes: None identified  Food/Nutrient Intake Outcomes: Diet advancement/tolerance, Food and nutrient intake  Physical Signs/Symptoms Outcomes: Biochemical data, Meal time behavior, Weight    Discharge Planning:    No discharge needs at this time    Jeff Malhotra RD  Contact: ext. [phone] or Email Data Source.

## 2022-09-07 NOTE — DISCHARGE SUMMARY
Physician Discharge Summary     Patient ID:    Fayne Boxer  [id number]  45 y.o.  1965    Admit date: 8/26/2022    Discharge date : 9/7/2022    Chronic Diagnoses:    Problem List as of 9/7/2022 Never Reviewed            Codes Class Noted - Resolved    CVA (cerebral vascular accident) [facility]) ICD-10-CM: I63.9  ICD-9-CM: 434.91  8/26/2022 - Present       22    Final Diagnoses:   CVA (cerebral vascular accident) ([facility] [I63.9]    Reason for Hospitalization:    Acute ischemic stroke left corona radiata with right hemiplegia, arm worse than leg     Uncontrolled hypertension, improved  Continue current antihypertensives     Ambulatory and gait disturbance    -Due to acute CVA    -Will   need inpatient rehabilitation     Tobacco abuse    Hyperlipidemia       Hospital Course: The patient is seen for follow  up.  66-year-old with history of essential hypertension and chronic tobacco use admitted for sudden onset right-sided weakness. Admitted for acute CVA work-up. MRI brain showed acute left corona radiata infarction. INSTRUCTIONS ON DISCHARGE     Please note that the new medications added to your regimen are:             AMLODIPINE 10 mg daily              ASPIRIN 81 mg daily              ATORVASTATIN 80 mg daily              LISINOPRIL 20 mg daily           Discharge Medications:   Current Discharge Medication List        START taking these medications    Details   amLODIPine (NORVASC) 10 mg tablet Take 1 Tablet by mouth daily. Qty: 30 Tablet, Refills: 0  Start date: 9/3/2022      lisinopriL (PRINIVIL, ZESTRIL) 20 mg tablet Take 1 Tablet by mouth daily. Qty: 30 Tablet, Refills: 0  Start date: 9/3/2022      atorvastatin (LIPITOR) 80 mg tablet Take 1 Tablet by mouth nightly. Qty: 30 Tablet, Refills: 0  Start date: 9/2/2022      aspirin 81 mg chewable tablet Take 1 Tablet by mouth daily.   Qty: 30 Tablet, Refills: 0  Start date: 9/3/2022      docusate sodium (COLACE) 100 mg capsule Take 2 Capsules by mouth daily for 90 days. Qty: 60 Capsule, Refills: 2  Start date: 9/3/2022, End date: 12/2/2022               Follow up Care:    1. None in 1-2 weeks. Please call to set up an appointment shortly after discharge. Diet:  Cardiac Diet    Disposition:  rehab    Advanced Directive:   FULL x   DNR      Discharge Exam:  General:  Alert, cooperative, no distress, appears stated age. Lungs:   Clear to auscultation bilaterally. Chest wall:  No tenderness or deformity. Heart:  Regular rate and rhythm, S1, S2 normal, no murmur, click, rub or gallop. Abdomen:   Soft, non-tender. Bowel sounds normal. No masses,  No organomegaly. Extremities: Extremities normal, atraumatic, no cyanosis or edema. Pulses: 2+ and symmetric all extremities. Skin: Skin color, texture, turgor normal. No rashes or lesions   Neurologic: CNII-XII intact. RUE 1/5. CONSULTATIONS: Neurology    Significant Diagnostic Studies:     Radiologic Studies -   No results found for this or any previous visit.      CT Results  (Last 48 hours)      None                Discharge time spent 35 minutes    Signed:  Radha Zacarias MD  9/7/2022  10:49 AM

## 2022-09-08 VITALS
WEIGHT: 163 LBS | SYSTOLIC BLOOD PRESSURE: 131 MMHG | BODY MASS INDEX: 26.2 KG/M2 | RESPIRATION RATE: 20 BRPM | HEIGHT: 66 IN | DIASTOLIC BLOOD PRESSURE: 70 MMHG | TEMPERATURE: 97.8 F | HEART RATE: 60 BPM | OXYGEN SATURATION: 99 %

## 2022-09-08 PROCEDURE — 74011250636 HC RX REV CODE- 250/636: Performed by: INTERNAL MEDICINE

## 2022-09-08 PROCEDURE — 74011250637 HC RX REV CODE- 250/637: Performed by: INTERNAL MEDICINE

## 2022-09-08 PROCEDURE — 94762 N-INVAS EAR/PLS OXIMTRY CONT: CPT

## 2022-09-08 RX ADMIN — DOCUSATE SODIUM 200 MG: 100 CAPSULE, LIQUID FILLED ORAL at 08:43

## 2022-09-08 RX ADMIN — AMLODIPINE BESYLATE 10 MG: 5 TABLET ORAL at 08:43

## 2022-09-08 RX ADMIN — ASPIRIN 81 MG CHEWABLE TABLET 81 MG: 81 TABLET CHEWABLE at 08:43

## 2022-09-08 RX ADMIN — LISINOPRIL 20 MG: 20 TABLET ORAL at 08:44

## 2022-09-08 RX ADMIN — ENOXAPARIN SODIUM 40 MG: 100 INJECTION SUBCUTANEOUS at 08:42

## 2022-09-08 NOTE — PROGRESS NOTES
Patient given verbal and written discharge instructions. IV removed. Patient notified that taxi will be here in one hour. Discharge plan of care/case management plan validated with provider discharge order.

## 2022-09-08 NOTE — PROGRESS NOTES
[facility] can accept Pt this morning. [address] , [address] [facility]    Room 9608, call report to Dr. Kennedi Jackson at [phone]          Discharge plan of care/case management plan validated with provider discharge order.

## 2022-10-11 ENCOUNTER — HOSPITAL ENCOUNTER (OUTPATIENT)
Dept: PHYSICAL THERAPY | Age: 57
Discharge: HOME | End: 2022-10-11
Payer: COMMERCIAL

## 2022-10-11 PROCEDURE — 97110 THERAPEUTIC EXERCISES: CPT

## 2022-10-11 PROCEDURE — 97161 PT EVAL LOW COMPLEX 20 MIN: CPT

## 2022-10-11 NOTE — THERAPY EVALUATION
W . [address] [place] [address], [address]  Phone: [phone]   Fax: [phone]    PT INITIAL EVALUATION NOTE - MCR 2-15  Plan of Care/Statement of Necessity for Physical Therapy Services  2-15    Patient Name: Saeid Kearns  DQTN:  : 1965  [x]  Patient  Verified  Provider#: [id number]  Payor: Naomi Sorto / Plan: Enrique Roger / Product Type: HMO /    Referral source: Daria Bassett MD   In time:900  Out time:1000  Total Treatment Time (min): 60  Total Timed Codes (min): 22  1:1 Treatment Time ( [address] only): 61   Visit #: 1      Start of Care: 10/11/22      Onset Date: 22 CVA      Treatment Area/Diagnosis: Muscle weakness (generalized) [M62.81]    SUBJECTIVE  Pain Level (0-10 scale): 0                Best: 0           Worst:  0  Description: CAN'T USE RIGHT HAND AND I AM RIGHT HANDED . TROUBLE WALKING. TROUBLE DOING SELF CARES. CAN'T DRIVE. CAN'T WORK. Any medication changes, allergies to medications, adverse drug reactions, diagnosis change, or new procedure performed?: [] No    [x] Yes (see summary sheet for update)      PLOF: WNL. WORKING IN  FOR SCHOOL  Mechanism of Injury: HTN CVA  Previous Treatment/Compliance: UNKNOWN  PMHx: NONE EXCEPT HTN  Surgical Hx: NONE  Prior Hospitalization: see medical history   Medications: Verified on Patient Summary List  Work Hx:  FOR [employer]  Living Situation: FRIEND  Pt Goals: GET MY ARM WORKING  Barriers: NONE  Motivation: WNL  Substance use: NONE  FABQ Score: AMPAC= 52%  Cognition: A & O x 3        OBJECTIVE/EXAMINATION  TALL. TRIM . WALKING WITH CANE. SOME RIGHT LIMP. SPEED 1.6 MPH. STAIRS; NEEDS RAILING, RECIPROCAL BUT AWKWARD.   STAND ON ONE LEG AND FLEX KNEE WNL LEFT, WITH SNAP INTO RECURVATUM RIGHT.  MMT; R SHRUGS 3/5, R SHOULDER ABD 1/5, FLEX 0/5; MAKE A FIST 0/5; ELBOW FLEX 0/5; DORSIFLEX 3+/5, HIP FLEX 4-/5; HIP EXT'N 2/5; LAQ; 3+/5; KNEE FLEX 1/5  BED MOBILITY GOOD. TRANSFERS GOOD. HAND  40LB LEFT, 0 LB RIGHT. 22 min Therapeutic Exercise:  [x] See flow sheet :   Rationale: increase strength to improve the patients ability to USE HAND; WALK      With   [x] TE   [] TA   [] neuro   [] other: Patient Education: [x] Review HEP  HC, P/O, BRIDGING, SHRUGS  [] Progressed/Changed HEP based on:   [] positioning   [] body mechanics   [] transfers   [] heat/ice application    [] other:      TUG: WFL    Pain Level (0-10 scale) post treatment: 0    ASSESSMENT/Key Information/Changes in Function:   PT INDICATED TO REHAB RESTORATION OF RIGHT HAND/ARM USE; IMPROVE AMBULATION SO CAN BE COMMUNITY AMBULATOR    Problem List: decrease strength, impaired gait/ balance, decrease ADL/ functional abilitiies, decrease activity tolerance, and decrease flexibility/ joint mobility    Short Term Goals: To be accomplished in 4 weeks:   IMPROVE RIGHT  TO 10 LB SO SHE CAN HOLD LIGHT OBJECTS  Long Term Goals:  To be accomplished in 8 weeks:   COMMUNITY AMBULATOR; USE HAND FOR SELF CARES INCLUDING EATING  Patient Goal (s): GET ARM WORKING    Evaluation Complexity History LOW Complexity : Zero comorbidities / personal factors that will impact the outcome / POC; Examination LOW Complexity : 1-2 Standardized tests and measures addressing body structure, function, activity limitation and / or participation in recreation  ;Presentation LOW Complexity : Stable, uncomplicated  ;Clinical Decision Making MEDIUM Complexity : FOTO score of 26-74  Overall Complexity Rating: LOW      Patient / Family readiness to learn indicated by: asking questions  Persons(s) to be included in education: patient (P)  Barriers to Learning/Limitations: None  Patient Self Reported Health Status: good  Rehabilitation Potential: good  Patient/ Caregiver education and instruction: exercises      PLAN:  Treatment Plan may include any combination of the following: Therapeutic exercise, Neuromuscular reeducation, Manual therapy, Therapeutic activity, Electric stim unattended , and Gait training  HEP Issued: SEE ABOVE    Frequency / Duration: Patient to be seen 2 times per week for 6-12 weeks. [x]  Plan of care has been reviewed with PTA    Certification Period: 10/11/22  to  1/10/23    Cade Mccarty, PT 10/11/2022     ________________________________________________________________________    I certify that the above Therapy Services are being furnished while the patient is under my care. I agree with the treatment plan and certify that this therapy is necessary.     Physician's Signature:____________________  Date:____________Time: _________            Danilo Carrillo MD

## 2022-10-13 ENCOUNTER — HOSPITAL ENCOUNTER (OUTPATIENT)
Dept: PHYSICAL THERAPY | Age: 57
Discharge: HOME | End: 2022-10-13
Payer: COMMERCIAL

## 2022-10-13 PROCEDURE — 97110 THERAPEUTIC EXERCISES: CPT

## 2022-10-13 NOTE — PROGRESS NOTES
PT DAILY TREATMENT NOTE - [facility] 2-15    Patient Name: Naseem Vu  Date:10/13/2022  : 1965  [x]  Patient  Verified  Payor: Sisi Isaacs / Plan: Gaby Tyler / Product Type: HMO /    In time:830  Out time:915  Total Treatment Time (min): 45  Total Timed Codes (min): 40  1:1 Treatment Time ( only): 40   Visit #:  2    Treatment Area: Muscle weakness (generalized) [M62.81]    SUBJECTIVE  Pain Level (0-10 scale): right arm sore when it is lifted away from the body, otherwise 1/10  Any medication changes, allergies to medications, adverse drug reactions, diagnosis change, or new procedure performed?: [x] No    [] Yes (see summary sheet for update)  Subjective functional status/changes:   [] No changes reported      OBJECTIVE    40 min Therapeutic Exercise:  [] See flow sheet :   Rationale: increase strength to improve the patients ability to have functional  and be community walker            With   [x] TE   [] TA   [] neuro   [] other: Patient Education: [x] Review HEP  pullovers, bridging , HC  [] Progressed/Changed HEP based on:   [] positioning   [] body mechanics   [] transfers   [] heat/ice application    [] other:         Pain Level (0-10 scale) post treatment: 1    ASSESSMENT/Changes in Function:     Patient will continue to benefit from skilled PT services to modify and progress therapeutic interventions to attain remaining goals. Progress towards goals / Updated goals:  Short Term Goals: To be accomplished in 4 weeks:              IMPROVE RIGHT  TO 10 LB SO SHE CAN HOLD LIGHT OBJECTS  Long Term Goals:  To be accomplished in 8 weeks:              COMMUNITY AMBULATOR; USE HAND FOR SELF CARES INCLUDING EATING  Patient Goal (s): GET ARM [facility]    PLAN  []  Upgrade activities as tolerated     [x]  Continue plan of care  []  Update interventions per flow sheet       []  Discharge due to:_  []  Other:_      Abraham Zazueta, PT 10/13/2022

## 2022-10-19 ENCOUNTER — HOSPITAL ENCOUNTER (OUTPATIENT)
Dept: PHYSICAL THERAPY | Age: 57
Discharge: HOME | End: 2022-10-19
Payer: COMMERCIAL

## 2022-10-19 PROCEDURE — 97110 THERAPEUTIC EXERCISES: CPT

## 2022-10-19 NOTE — PROGRESS NOTES
PT DAILY TREATMENT NOTE - [facility] 2-15    Patient Name: Alyson Rowland  Date:10/19/2022  : 1965  [x]  Patient  Verified  Payor: Claudio Newton / Plan: Radha Gains / Product Type: HMO /    In time:1035  Out time: 1120  Total Treatment Time (min): 45  Total Timed Codes (min): 40  1:1 Treatment Time ( only): 40   Visit #:  3    Treatment Area: Muscle weakness (generalized) [M62.81]    SUBJECTIVE  Pain Level (0-10 scale): right arm sore when it is lifted away from the body, otherwise 1/10  Any medication changes, allergies to medications, adverse drug reactions, diagnosis change, or new procedure performed?: [x] No    [] Yes (see summary sheet for update)  Subjective functional status/changes:   [] No changes reported      OBJECTIVE    40 min Therapeutic Exercise:  [] See flow sheet :   Rationale: increase strength to improve the patients ability to have functional  and be community walker            With   [x] TE   [] TA   [] neuro   [] other: Patient Education: [x] Review HEP  pullovers, bridging , HC  [] Progressed/Changed HEP based on:   [] positioning   [] body mechanics   [] transfers   [] heat/ice application    [] other:         Pain Level (0-10 scale) post treatment: 1    ASSESSMENT/Changes in Function:     Patient will continue to benefit from skilled PT services to modify and progress therapeutic interventions to attain remaining goals. Progress towards goals / Updated goals:  Short Term Goals: To be accomplished in 4 weeks:              IMPROVE RIGHT  TO 10 LB SO SHE CAN HOLD LIGHT OBJECTS  Long Term Goals:  To be accomplished in 8 weeks:              COMMUNITY AMBULATOR; USE HAND FOR SELF CARES INCLUDING EATING  Patient Goal (s): GET ARM Danilo Nares    PLAN  []  Upgrade activities as tolerated     [x]  Continue plan of care  []  Update interventions per flow sheet       []  Discharge due to:_  []  Other:_      Leeroy Pathak, PT 10/19/2022

## 2022-10-20 ENCOUNTER — HOSPITAL ENCOUNTER (OUTPATIENT)
Dept: PHYSICAL THERAPY | Age: 57
Discharge: HOME | End: 2022-10-20
Payer: COMMERCIAL

## 2022-10-20 PROCEDURE — 97110 THERAPEUTIC EXERCISES: CPT

## 2022-10-20 NOTE — PROGRESS NOTES
PT DAILY TREATMENT NOTE - [facility] -15    Patient Name: Matthew Mckeon  Date:10/20/2022  : 1965  [x]  Patient  Verified  Payor: Rosalee Becerra / Plan: Jefe Pak / Product Type: HMO /    In time:1045  Out time: 1130  Total Treatment Time (min): 45  Total Timed Codes (min): 45  1:1 Treatment Time ( only): 45   Visit #:  4    Treatment Area: Muscle weakness (generalized) [M62.81]    SUBJECTIVE  Pain Level (0-10 scale): right arm sore when it is lifted away from the body, otherwise 1/10  Any medication changes, allergies to medications, adverse drug reactions, diagnosis change, or new procedure performed?: [x] No    [] Yes (see summary sheet for update)  Subjective functional status/changes:   [] No changes reported      OBJECTIVE    45 min Therapeutic Exercise:  [] See flow sheet :   Rationale: increase strength to improve the patients ability to have functional  and be community walker            With   [x] TE   [] TA   [] neuro   [] other: Patient Education: [x] Review HEP  pullovers, bridging , HC  [] Progressed/Changed HEP based on:   [] positioning   [] body mechanics   [] transfers   [] heat/ice application    [] other:         Pain Level (0-10 scale) post treatment: 1    ASSESSMENT/Changes in Function:     Patient will continue to benefit from skilled PT services to modify and progress therapeutic interventions to attain remaining goals. \"Good session of PT. Now tired. Making progress; R hand moving a little more. \"         Progress towards goals / Updated goals:  Short Term Goals: To be accomplished in 4 weeks:              IMPROVE RIGHT  TO 10 LB SO SHE CAN HOLD LIGHT OBJECTS  Long Term Goals:  To be accomplished in 8 weeks:              COMMUNITY AMBULATOR; USE HAND FOR SELF CARES INCLUDING EATING  Patient Goal (s): GET ARM Teri Sitter  []  Upgrade activities as tolerated     [x]  Continue plan of care  []  Update interventions per flow sheet       []  Discharge due to:_  []  Other:_ Ellen Burton, PT 10/20/2022

## 2022-10-26 ENCOUNTER — HOSPITAL ENCOUNTER (OUTPATIENT)
Dept: PHYSICAL THERAPY | Age: 57
Discharge: HOME | End: 2022-10-26
Payer: COMMERCIAL

## 2022-10-26 PROCEDURE — 97110 THERAPEUTIC EXERCISES: CPT

## 2022-10-26 PROCEDURE — 97140 MANUAL THERAPY 1/> REGIONS: CPT

## 2022-10-26 NOTE — PROGRESS NOTES
PT DAILY TREATMENT NOTE - [facility] -15    Patient Name: Luis Robb  Date:10/26/2022  : 1965  [x]  Patient  Verified  Payor: Prabha Sam / Plan: Yuli Fontaine / Product Type: HMO /    In time:1145  Out time: 1230  Total Treatment Time (min): 45  Total Timed Codes (min): 38  1:1 Treatment Time ( only): 38  Visit #:  5    Treatment Area: Muscle weakness (generalized) [M62.81]    SUBJECTIVE  Pain Level (0-10 scale): right arm sore when it is lifted away from the body, otherwise 1/10  Any medication changes, allergies to medications, adverse drug reactions, diagnosis change, or new procedure performed?: [x] No    [] Yes (see summary sheet for update)  Subjective functional status/changes:   [] No changes reported  Pt reports that her daughter helps her with her HEP at home. OBJECTIVE    28 min Therapeutic Exercise:  [x] See flow sheet :   Rationale: increase strength to improve the patients ability to have functional  and be community walker    10 min Manual Therapy: PROM, Grade 1 mob    Rationale: increase ROM and increase tissue extensibility to improve the patients ability to tolerate ADLs          With   [x] TE   [] TA   [] neuro   [] other: Patient Education: [x] Review HEP  pullovers, bridging , HC  [] Progressed/Changed HEP based on:   [] positioning   [] body mechanics   [] transfers   [] heat/ice application    [] other:         Pain Level (0-10 scale) post treatment: 0    ASSESSMENT/Changes in Function:   Pt tolerated session very well. Pt interested in OT to help with remaining deficits of RUE. Patient will continue to benefit from skilled PT services to modify and progress therapeutic interventions to attain remaining goals. \"Good session of PT. Now tired. Making progress; R hand moving a little more. \"         Progress towards goals / Updated goals:  Short Term Goals:  To be accomplished in 4 weeks:              IMPROVE RIGHT  TO 10 LB SO SHE CAN HOLD LIGHT OBJECTS  Long Term Goals:  To be accomplished in 8 weeks:              COMMUNITY AMBULATOR; USE HAND FOR SELF CARES INCLUDING EATING  Patient Goal (s): GET ARM Destiny Leos    PLAN  [x]  Upgrade activities as tolerated     [x]  Continue plan of care  []  Update interventions per flow sheet       []  Discharge due to:_  []  Other:_      Josue Hinojosa., PTA 10/26/2022

## 2022-10-28 ENCOUNTER — APPOINTMENT (OUTPATIENT)
Dept: PHYSICAL THERAPY | Age: 57
End: 2022-10-28
Payer: COMMERCIAL

## 2022-11-02 ENCOUNTER — HOSPITAL ENCOUNTER (OUTPATIENT)
Dept: PHYSICAL THERAPY | Age: 57
Discharge: HOME | End: 2022-11-02
Payer: MEDICAID

## 2022-11-02 PROCEDURE — 97110 THERAPEUTIC EXERCISES: CPT

## 2022-11-02 NOTE — PROGRESS NOTES
PT DAILY TREATMENT NOTE - [facility] 2-15    Patient Name: Saeid Kearns  Date:2022  : 1965  [x]  Patient  Verified  Payor: Marylouzahra Sarthak / [place] [employer] [place] / Product Type: Managed Care Medicaid /    In time:9am Out time: 945am  Total Treatment Time (min): 45  Total Timed Codes (min): 40  1:1 Treatment Time ( only): 40  Visit #:  6    Treatment Area: Muscle weakness (generalized) [M62.81]    SUBJECTIVE  Pain Level (0-10 scale): right arm sore when it is lifted away from the body, otherwise 3/10  Any medication changes, allergies to medications, adverse drug reactions, diagnosis change, or new procedure performed?: [x] No    [] Yes (see summary sheet for update)  Subjective functional status/changes:   [] No changes reported  Pt reports that she had some movement within her hand a few days ago. OBJECTIVE    40 min Therapeutic Exercise:  [x] See flow sheet :   Rationale: increase strength to improve the patients ability to have functional  and be community walker     min Manual Therapy: PROM, Grade 1 mob    Rationale: increase ROM and increase tissue extensibility to improve the patients ability to tolerate ADLs          With   [x] TE   [] TA   [] neuro   [] other: Patient Education: [x] Review HEP  pullovers, bridging , HC  [] Progressed/Changed HEP based on:   [] positioning   [] body mechanics   [] transfers   [] heat/ice application    [] other:         Pain Level (0-10 scale) post treatment: 2    ASSESSMENT/Changes in Function:   Pt tolerated session very well. Pt interested in OT to help with remaining deficits of RUE. Pt able to complete pulley, wheel, and UBE with ace wrap around right hand to maintain . Patient will continue to benefit from skilled PT services to modify and progress therapeutic interventions to attain remaining goals. \"Good session of PT. Now tired. Making progress; R hand moving a little more. \"         Progress towards goals / Updated goals:  Short Term Goals: To be accomplished in 4 weeks:              IMPROVE RIGHT  TO 10 LB SO SHE CAN HOLD LIGHT OBJECTS  Long Term Goals:  To be accomplished in 8 weeks:              COMMUNITY AMBULATOR; USE HAND FOR SELF CARES INCLUDING EATING  Patient Goal (s): GET ARM Bevelyn Cruise    PLAN  [x]  Upgrade activities as tolerated     [x]  Continue plan of care  []  Update interventions per flow sheet       []  Discharge due to:_  []  Other:_      Amber Everett., PTA 11/2/2022

## 2022-11-04 ENCOUNTER — HOSPITAL ENCOUNTER (OUTPATIENT)
Dept: PHYSICAL THERAPY | Age: 57
Discharge: HOME | End: 2022-11-04
Payer: MEDICAID

## 2022-11-04 PROCEDURE — 97110 THERAPEUTIC EXERCISES: CPT

## 2022-11-04 NOTE — PROGRESS NOTES
PT DAILY TREATMENT NOTE - [facility] 2-15    Patient Name: Fani Route  Date:2022  : 1965  [x]  Patient  Verified  Payor: Stefano Zavala / Plan: [id number] / Product Type: Managed Care Medicaid /    In time:1045am Out time: 1130am  Total Treatment Time (min): 45  Total Timed Codes (min): 40  1:1 Treatment Time ( only): 40  Visit #:  7 2    Treatment Area: Muscle weakness (generalized) [M62.81]    SUBJECTIVE  Pain Level (0-10 scale): right arm sore when it is lifted away from the body, otherwise 0/10  Any medication changes, allergies to medications, adverse drug reactions, diagnosis change, or new procedure performed?: [x] No    [] Yes (see summary sheet for update)  Subjective functional status/changes:   [] No changes reported  Pt reports that she had some movement within her hand a few days ago. OBJECTIVE    40 min Therapeutic Exercise:  [x] See flow sheet :   Rationale: increase strength to improve the patients ability to have functional  and be community walker     min Manual Therapy: PROM, Grade 1 mob    Rationale: increase ROM and increase tissue extensibility to improve the patients ability to tolerate ADLs          With   [x] TE   [] TA   [] neuro   [] other: Patient Education: [x] Review HEP  pullovers, bridging , HC  [] Progressed/Changed HEP based on:   [] positioning   [] body mechanics   [] transfers   [] heat/ice application    [] other:         Pain Level (0-10 scale) post treatment: 0    ASSESSMENT/Changes in Function:   Pt tolerated session very well. Pt interested in OT to help with remaining deficits of RUE. Pt able to complete pulley, wheel, and UBE with ace wrap around right hand to maintain . Discussed with pt about options for knee brace to reduce genu recurvatum during gait. Patient will continue to benefit from skilled PT services to modify and progress therapeutic interventions to attain remaining goals. \"Good session of PT. Now tired.  Making progress; R hand moving a little more. \"         Progress towards goals / Updated goals:  Short Term Goals: To be accomplished in 4 weeks:              IMPROVE RIGHT  TO 10 LB SO SHE CAN HOLD LIGHT OBJECTS  Long Term Goals:  To be accomplished in 8 weeks:              COMMUNITY AMBULATOR; USE HAND FOR SELF CARES INCLUDING EATING  Patient Goal (s): GET ARM Marilynonelia Trianarabia    PLAN  [x]  Upgrade activities as tolerated     [x]  Continue plan of care  []  Update interventions per flow sheet       []  Discharge due to:_  []  Other:_      Sanjay Stearns., PTA 11/4/2022

## 2022-11-09 ENCOUNTER — HOSPITAL ENCOUNTER (OUTPATIENT)
Dept: PHYSICAL THERAPY | Age: 57
Discharge: HOME | End: 2022-11-09
Payer: MEDICAID

## 2022-11-09 PROCEDURE — 97110 THERAPEUTIC EXERCISES: CPT

## 2022-11-09 NOTE — PROGRESS NOTES
PT DAILY TREATMENT NOTE - [facility] 2-15    Patient Name: Ayush Sommer  Date:2022  : 1965  [x]  Patient  Verified  Payor: Jay Reynolds / Plan: [employer] / Product Type: Managed Care Medicaid /    In time: 250 Out time: 830 pm  Total Treatment Time (min): 45  Total Timed Codes (min): 40  1:1 Treatment Time ( only): 40  Visit #:  8 2/4    Treatment Area: Muscle weakness (generalized) [M62.81]    SUBJECTIVE  Pain Level (0-10 scale): right arm sore when it is lifted away from the body, otherwise 0/10  Any medication changes, allergies to medications, adverse drug reactions, diagnosis change, or new procedure performed?: [x] No    [] Yes (see summary sheet for update)  Subjective functional status/changes:   [] No changes reported  Pt said she had to leave 830 this morning. OBJECTIVE    40 min Therapeutic Exercise:  [x] See flow sheet :   Rationale: increase strength to improve the patients ability to have functional  and be community walker     min Manual Therapy: PROM, Grade 1 mob    Rationale: increase ROM and increase tissue extensibility to improve the patients ability to tolerate ADLs          With   [x] TE   [] TA   [] neuro   [] other: Patient Education: [x] Review HEP  pullovers, bridging , HC  [] Progressed/Changed HEP based on:   [] positioning   [] body mechanics   [] transfers   [] heat/ice application    [] other:         Pain Level (0-10 scale) post treatment: 0    ASSESSMENT/Changes in Function:   Pt tolerated session very well. Discussed with pt about options for knee brace to reduce genu recurvatum during gait. Patient will continue to benefit from skilled PT services to modify and progress therapeutic interventions to attain remaining goals. \"Good session of PT. Now tired. Making progress; R hand moving a little more. \"         Progress towards goals / Updated goals:  Short Term Goals:  To be accomplished in 4 weeks:              IMPROVE RIGHT  TO 10 LB SO SHE CAN HOLD LIGHT OBJECTS  Long Term Goals:  To be accomplished in 8 weeks:              COMMUNITY AMBULATOR; USE HAND FOR SELF CARES INCLUDING EATING  Patient Goal (s): GET ARM Wolm Ahumada    PLAN  [x]  Upgrade activities as tolerated     [x]  Continue plan of care  []  Update interventions per flow sheet       []  Discharge due to:_  []  Other:_      Candace Gordillo, PT 11/9/2022

## 2022-11-10 ENCOUNTER — HOSPITAL ENCOUNTER (OUTPATIENT)
Dept: PHYSICAL THERAPY | Age: 57
Discharge: HOME | End: 2022-11-10
Payer: MEDICAID

## 2022-11-10 PROCEDURE — 97110 THERAPEUTIC EXERCISES: CPT

## 2022-11-10 NOTE — PROGRESS NOTES
PT DAILY TREATMENT NOTE - [facility] 2-15    Patient Name: Maci He  Date:11/10/2022  : 1965  [x]  Patient  Verified  Payor: Guillermo Hardin / Plan: Kaylie Seller / Product Type: Managed Care Medicaid /    In time: 1000   Out time: 1045  Total Treatment Time (min): 45  Total Timed Codes (min): 40  1:1 Treatment Time ( only): 40  Visit #:  9           Treatment Area: Muscle weakness (generalized) [M62.81]    SUBJECTIVE  Pain Level (0-10 scale): right arm sore when it is lifted away from the body, otherwise 0/10  Any medication changes, allergies to medications, adverse drug reactions, diagnosis change, or new procedure performed?: [x] No    [] Yes (see summary sheet for update)  Subjective functional status/changes:   [] No changes reported  In the process of moving    OBJECTIVE    40 min Therapeutic Exercise:  [x] See flow sheet :   Rationale: increase strength to improve the patients ability to have functional  and be community walker     min Manual Therapy: PROM, Grade 1 mob    Rationale: increase ROM and increase tissue extensibility to improve the patients ability to tolerate ADLs          With   [x] TE   [] TA   [] neuro   [] other: Patient Education: [x] Review HEP  pullovers, bridging , HC  [] Progressed/Changed HEP based on:   [] positioning   [] body mechanics   [] transfers   [] heat/ice application    [] other:         Pain Level (0-10 scale) post treatment: 0    ASSESSMENT/Changes in Function:   Pt tolerated session very well. Discussed with pt about options for knee brace to reduce genu recurvatum during gait. Patient will continue to benefit from skilled PT services to modify and progress therapeutic interventions to attain remaining goals. \"Good session of PT. Now tired. Making progress; R hand moving a little more. \"         Progress towards goals / Updated goals:  Short Term Goals:  To be accomplished in 4 weeks:              IMPROVE RIGHT  TO 10 LB SO SHE CAN HOLD LIGHT OBJECTS; goal not met; arom hand only trace thumb flex  Long Term Goals: To be accomplished in 8 weeks:              COMMUNITY AMBULATOR; walking okay 2 mph w/o assistive device so as of 11/10/22 goal met  USE HAND FOR SELF CARES INCLUDING EATING; goal not met.   Patient Goal (s): GET [facility]    PLAN  [x]  Upgrade activities as tolerated     [x]  Continue plan of care  []  Update interventions per flow sheet       []  Discharge due to:_  []  Other:_      Kamila Corey, PT 11/10/2022

## 2022-11-17 ENCOUNTER — APPOINTMENT (OUTPATIENT)
Dept: PHYSICAL THERAPY | Age: 57
End: 2022-11-17
Payer: MEDICAID

## 2022-11-18 ENCOUNTER — HOSPITAL ENCOUNTER (OUTPATIENT)
Dept: PHYSICAL THERAPY | Age: 57
Discharge: HOME | End: 2022-11-18
Payer: MEDICAID

## 2022-11-18 PROCEDURE — 97110 THERAPEUTIC EXERCISES: CPT

## 2022-11-18 NOTE — PROGRESS NOTES
PT DAILY TREATMENT NOTE - [facility] 2-15    Patient Name: Fani Route  Date:2022  : 1965  [x]  Patient  Verified  Payor: Stefano Zavala / Plan: [id number] / Product Type: Managed Care Medicaid /    In time: 1000   Out time: 1030  Total Treatment Time (min): 30  Total Timed Codes (min): 23  1:1 Treatment Time ( only): 23  Visit #:  10          Treatment Area: Muscle weakness (generalized) [M62.81]    SUBJECTIVE  Pain Level (0-10 scale): right arm sore when it is lifted away from the body, otherwise 0/10  Any medication changes, allergies to medications, adverse drug reactions, diagnosis change, or new procedure performed?: [x] No    [] Yes (see summary sheet for update)  Subjective functional status/changes:   [] No changes reported  Pt reports that she is having some motion within her right thumb and index finger. OBJECTIVE    23 min Therapeutic Exercise:  [x] See flow sheet :   Rationale: increase strength to improve the patients ability to have functional  and be community walker     min Manual Therapy: PROM, Grade 1 mob    Rationale: increase ROM and increase tissue extensibility to improve the patients ability to tolerate ADLs          With   [x] TE   [] TA   [] neuro   [] other: Patient Education: [x] Review HEP  pullovers, bridging , HC  [] Progressed/Changed HEP based on:   [] positioning   [] body mechanics   [] transfers   [] heat/ice application    [] other:         Pain Level (0-10 scale) post treatment: 0    ASSESSMENT/Changes in Function:   Pt tolerated session very well. Pt requested to end session early to make another appointment. Patient will continue to benefit from skilled PT services to modify and progress therapeutic interventions to attain remaining goals. \"Good session of PT. Now tired. Making progress; R hand moving a little more. \"         Progress towards goals / Updated goals:  Short Term Goals:  To be accomplished in 4 weeks:              IMPROVE RIGHT  TO 10 LB SO SHE CAN HOLD LIGHT OBJECTS; goal not met; arom hand only trace thumb flex  Long Term Goals: To be accomplished in 8 weeks:              COMMUNITY AMBULATOR; walking okay 2 mph w/o assistive device so as of 11/10/22 goal met  USE HAND FOR SELF CARES INCLUDING EATING; goal not met.   Patient Goal (s): GET [facility]    PLAN  [x]  Upgrade activities as tolerated     [x]  Continue plan of care  []  Update interventions per flow sheet       []  Discharge due to:_  []  Other:_      Angelique Silver., PTA 11/18/2022

## 2022-11-30 ENCOUNTER — HOSPITAL ENCOUNTER (OUTPATIENT)
Dept: PHYSICAL THERAPY | Age: 57
End: 2022-11-30
Payer: MEDICAID

## 2022-11-30 PROCEDURE — 97110 THERAPEUTIC EXERCISES: CPT

## 2022-11-30 NOTE — PROGRESS NOTES
PT DAILY TREATMENT NOTE - [facility] 2-15    Patient Name: Mirna Messina  Date:2022  : 1965  [x]  Patient  Verified  Payor: Pina Hand / Plan: Mani Faulkner / Product Type: Managed Care Medicaid /    In time: 8979   Out time: 1200  Total Treatment Time (min): 40  Total Timed Codes (min): 40  1:1 Treatment Time ( only): 40  Visit #:         Treatment Area: Muscle weakness (generalized) [M62.81]    SUBJECTIVE  Pain Level (0-10 scale): right arm sore when it is lifted away from the body, otherwise 0/10  Any medication changes, allergies to medications, adverse drug reactions, diagnosis change, or new procedure performed?: [x] No    [] Yes (see summary sheet for update)  Subjective functional status/changes:   [] No changes reported  Pt reports that she is having some motion within her right thumb and index finger. I am awaiting referral from doc to get some OT since thumb is moving a flicker now. OBJECTIVE    40 min Therapeutic Exercise:  [x] See flow sheet :   Rationale: increase strength to improve the patients ability to have functional  and be community walker     min Manual Therapy: PROM, Grade 1 mob    Rationale: increase ROM and increase tissue extensibility to improve the patients ability to tolerate ADLs          With   [x] TE   [] TA   [] neuro   [] other: Patient Education: [x] Review HEP  pullovers, bridging , HC  [] Progressed/Changed HEP based on:   [] positioning   [] body mechanics   [] transfers   [] heat/ice application    [] other:         Pain Level (0-10 scale) post treatment: 0    ASSESSMENT/Changes in Function:   Pt tolerated session very well. Pt requested to end session early to make another appointment. Patient will continue to benefit from skilled PT services to modify and progress therapeutic interventions to attain remaining goals. \"Good session of PT. Have to leave at noon. Making progress; R hand moving a little more. \"         Progress towards goals / Updated goals:  Short Term Goals: To be accomplished in 4 weeks:              IMPROVE RIGHT  TO 10 LB SO SHE CAN HOLD LIGHT OBJECTS; goal not met; arom hand only trace thumb flex  Long Term Goals: To be accomplished in 8 weeks:              COMMUNITY AMBULATOR; walking okay 2 mph w/o assistive device so as of 11/10/22 goal met  USE HAND FOR SELF CARES INCLUDING EATING; goal not met.   Patient Goal (s): GET [facility]    PLAN  [x]  Upgrade activities as tolerated     [x]  Continue plan of care  []  Update interventions per flow sheet       []  Discharge due to:_  []  Other:_      Hermon Schirmer, PT 11/30/2022

## 2022-12-02 ENCOUNTER — HOSPITAL ENCOUNTER (OUTPATIENT)
Dept: PHYSICAL THERAPY | Age: 57
Discharge: HOME | End: 2022-12-02
Payer: MEDICAID

## 2022-12-02 PROCEDURE — 97110 THERAPEUTIC EXERCISES: CPT

## 2022-12-02 NOTE — PROGRESS NOTES
PT DAILY TREATMENT NOTE - [facility] 2-15    Patient Name: Fani Route  Date:2022  : 1965  [x]  Patient  Verified  Payor: Sarahhipolito Orozcoi / Plan: [facility] MEDICAID / Product Type: Managed Care Medicaid /    In time: 10  Out time:   Total Treatment Time (min): 35  Total Timed Codes (min): 29  1:1 Treatment Time ( only): 29  Visit #:     3 /24    Treatment Area: Muscle weakness (generalized) [M62.81]    SUBJECTIVE  Pain Level (0-10 scale): right arm sore when it is lifted away from the body, otherwise 0/10  Any medication changes, allergies to medications, adverse drug reactions, diagnosis change, or new procedure performed?: [x] No    [] Yes (see summary sheet for update)  Subjective functional status/changes:   [] No changes reported    Pt reports that she is having some motion within her right thumb and index finger. I am awaiting referral from doc to get some OT since thumb is moving a flicker now. OBJECTIVE    29 min Therapeutic Exercise:  [x] See flow sheet :   Rationale: increase strength to improve the patients ability to have functional  and be community walker     min Manual Therapy: PROM, Grade 1 mob    Rationale: increase ROM and increase tissue extensibility to improve the patients ability to tolerate ADLs          With   [x] TE   [] TA   [] neuro   [] other: Patient Education: [x] Review HEP  pullovers, bridging , HC  [] Progressed/Changed HEP based on:   [] positioning   [] body mechanics   [] transfers   [] heat/ice application    [] other:         Pain Level (0-10 scale) post treatment: 0    ASSESSMENT/Changes in Function:   Pt tolerated session very well. Pt requested to end session early to make another appointment. Patient will continue to benefit from skilled PT services to modify and progress therapeutic interventions to attain remaining goals. Progress towards goals / Updated goals:  Short Term Goals:  To be accomplished in 4 weeks:              IMPROVE RIGHT  TO 10 LB SO SHE CAN HOLD LIGHT OBJECTS; goal not met; arom hand only trace thumb flex  Long Term Goals: To be accomplished in 8 weeks:              COMMUNITY AMBULATOR; walking okay 2 mph w/o assistive device so as of 11/10/22 goal met  USE HAND FOR SELF CARES INCLUDING EATING; goal not met.   Patient Goal (s): GET [facility]    PLAN  [x]  Upgrade activities as tolerated     [x]  Continue plan of care  []  Update interventions per flow sheet       []  Discharge due to:_  []  Other:_      Gerardo Rosales., PTA 12/2/2022

## 2022-12-08 ENCOUNTER — HOSPITAL ENCOUNTER (OUTPATIENT)
Dept: PHYSICAL THERAPY | Age: 57
Discharge: HOME | End: 2022-12-08
Payer: MEDICAID

## 2022-12-08 PROCEDURE — 97110 THERAPEUTIC EXERCISES: CPT

## 2022-12-08 NOTE — PROGRESS NOTES
PT DAILY TREATMENT NOTE - [facility] 2-15    Patient Name: Bozena Hernandez  Date:2022  : 1965  [x]  Patient  Verified  Payor: Aliza Calvo / Plan: Gypsy Given / Product Type: Managed Care Medicaid /    In time: 393  Out time: 1045  Total Treatment Time (min): 35  Total Timed Codes (min): 28  1:1 Treatment Time ( only): 28  Visit #:         Treatment Area: Muscle weakness (generalized) [M62.81]    SUBJECTIVE  Pain Level (0-10 scale): right arm sore when it is lifted away from the body, otherwise 0/10  Any medication changes, allergies to medications, adverse drug reactions, diagnosis change, or new procedure performed?: [x] No    [] Yes (see summary sheet for update)  Subjective functional status/changes:   [] No changes reported    Pt reports that she is having some motion within her right thumb and index finger. I am awaiting referral from doc to get some OT since thumb is moving a flicker now. OBJECTIVE    28 min Therapeutic Exercise:  [x] See flow sheet :   Rationale: increase strength to improve the patients ability to have functional  and be community walker     min Manual Therapy: PROM, Grade 1 mob    Rationale: increase ROM and increase tissue extensibility to improve the patients ability to tolerate ADLs          With   [x] TE   [] TA   [] neuro   [] other: Patient Education: [x] Review HEP  pullovers, bridging , HC  [] Progressed/Changed HEP based on:   [] positioning   [] body mechanics   [] transfers   [] heat/ice application    [] other:         Pain Level (0-10 scale) post treatment: 0    ASSESSMENT/Changes in Function:   Pt tolerated session very well. Uses R UE to  for bike and with wheel and push/pull exercise. No assistive device in gym. Pt requested to end session early due to transportation. Patient will continue to benefit from skilled PT services to modify and progress therapeutic interventions to attain remaining goals.          Progress towards goals / Updated goals:  Short Term Goals: To be accomplished in 4 weeks:              IMPROVE RIGHT  TO 10 LB SO SHE CAN HOLD LIGHT OBJECTS; goal not met; arom hand only trace thumb flex  Long Term Goals: To be accomplished in 8 weeks:              COMMUNITY AMBULATOR; walking okay 2 mph w/o assistive device so as of 11/10/22 goal met  USE HAND FOR SELF CARES INCLUDING EATING; goal not met.   Patient Goal (s): GET [facility]    PLAN  [x]  Upgrade activities as tolerated     [x]  Continue plan of care  []  Update interventions per flow sheet       []  Discharge due to:_  []  Other:_      Felipe Drake, PT 12/8/2022

## 2022-12-09 ENCOUNTER — APPOINTMENT (OUTPATIENT)
Dept: PHYSICAL THERAPY | Age: 57
End: 2022-12-09
Payer: MEDICAID

## 2022-12-14 ENCOUNTER — HOSPITAL ENCOUNTER (OUTPATIENT)
Dept: PHYSICAL THERAPY | Age: 57
Discharge: HOME | End: 2022-12-14
Payer: MEDICAID

## 2022-12-14 PROCEDURE — 97110 THERAPEUTIC EXERCISES: CPT | Performed by: PHYSICAL THERAPIST

## 2022-12-14 NOTE — PROGRESS NOTES
PT DAILY TREATMENT NOTE - [facility] 2-15    Patient Name: Lesly Emerson  Date:2022  : 1965  [x]  Patient  Verified  Payor: Carmine Westfall / Plan: Yara Chavez / Product Type: Managed Care Medicaid /    In time: 910 am   Out time: 945   Total Treatment Time (min): 35  Total Timed Codes (min): 28  1:1 Treatment Time ( only): 28  Visit #:         Treatment Area: Muscle weakness (generalized) [M62.81]    SUBJECTIVE  Pain Level (0-10 scale): right arm sore when it is lifted away from the body, otherwise 0/10  Any medication changes, allergies to medications, adverse drug reactions, diagnosis change, or new procedure performed?: [x] No    [] Yes (see summary sheet for update)  Subjective functional status/changes:   [] No changes reported    Patient reports she is limited on time today due to ride issues. Continues to wait for OT approval.         IOBJECTIVE    28 min Therapeutic Exercise:  [x] See flow sheet :   Rationale: increase strength to improve the patients ability to have functional  and be community walker     min Manual Therapy: PROM, Grade 1 mob    Rationale: increase ROM and increase tissue extensibility to improve the patients ability to tolerate ADLs          With   [x] TE   [] TA   [] neuro   [] other: Patient Education: [x] Review HEP  pullovers, bridging , HC  [] Progressed/Changed HEP based on:   [] positioning   [] body mechanics   [] transfers   [] heat/ice application    [] other:         Pain Level (0-10 scale) post treatment: 0    ASSESSMENT/Changes in Function:   Pt tolerated session very well. Suggested compression glove to right had to help with fluid. Also discussed constraint therapy to force right UE to perform routine ADLs with emphasis on safety. Enthusiastically participates in PT. Patient will continue to benefit from skilled PT services to modify and progress therapeutic interventions to attain remaining goals.          Progress towards goals / Updated goals:  Short Term Goals: To be accomplished in 4 weeks:              IMPROVE RIGHT  TO 10 LB SO SHE CAN HOLD LIGHT OBJECTS; goal not met; arom hand only trace thumb flex  Long Term Goals: To be accomplished in 8 weeks:              COMMUNITY AMBULATOR; walking okay 2 mph w/o assistive device so as of 11/10/22 goal met  USE HAND FOR SELF CARES INCLUDING EATING; goal not met.   Patient Goal (s): GET [facility]    PLAN  [x]  Upgrade activities as tolerated     [x]  Continue plan of care  []  Update interventions per flow sheet       []  Discharge due to:_  []  Other:_      April Agusto-Radha, PT 12/14/2022

## 2022-12-16 ENCOUNTER — HOSPITAL ENCOUNTER (OUTPATIENT)
Dept: PHYSICAL THERAPY | Age: 57
Discharge: HOME | End: 2022-12-16
Payer: MEDICAID

## 2022-12-16 PROCEDURE — 97110 THERAPEUTIC EXERCISES: CPT

## 2022-12-16 NOTE — PROGRESS NOTES
PT DAILY TREATMENT NOTE - [facility] 2-15    Patient Name: Ayush Sommer  Date:2022  : 1965  [x]  Patient  Verified  Payor: Jay Reynolds / Plan: [employer] / Product Type: Managed Care Medicaid /    In time: 910 am   Out time: 950  Total Treatment Time (min): 35  Total Timed Codes (min): 30  1:1 Treatment Time ( only): 30  Visit #:         Treatment Area: Muscle weakness (generalized) [M62.81]    SUBJECTIVE  Pain Level (0-10 scale): right arm sore when it is lifted away from the body, otherwise 0/10  Any medication changes, allergies to medications, adverse drug reactions, diagnosis change, or new procedure performed?: [x] No    [] Yes (see summary sheet for update)  Subjective functional status/changes:   [] No changes reported    Patient reports she is limited on time today due to ride issues. Continues to wait for OT approval.         IOBJECTIVE    30 min Therapeutic Exercise:  [x] See flow sheet :   Rationale: increase strength to improve the patients ability to have functional  and be community walker     min Manual Therapy: PROM, Grade 1 mob    Rationale: increase ROM and increase tissue extensibility to improve the patients ability to tolerate ADLs          With   [x] TE   [] TA   [] neuro   [] other: Patient Education: [x] Review HEP  pullovers, bridging , HC  [] Progressed/Changed HEP based on:   [] positioning   [] body mechanics   [] transfers   [] heat/ice application    [] other:         Pain Level (0-10 scale) post treatment: 0    ASSESSMENT/Changes in Function:   Pt tolerated session very well. Involved R hand in therapy session. Able to pinch rings between thumb and index finger while standing on compliant surface. Moves well between exercises. .   Enthusiastically participates in PT. Patient will continue to benefit from skilled PT services to modify and progress therapeutic interventions to attain remaining goals.          Progress towards goals / Updated goals:  Short Term Goals: To be accomplished in 4 weeks:              IMPROVE RIGHT  TO 10 LB SO SHE CAN HOLD LIGHT OBJECTS; goal not met; arom hand only trace thumb flex  Long Term Goals: To be accomplished in 8 weeks:              COMMUNITY AMBULATOR; walking okay 2 mph w/o assistive device so as of 11/10/22 goal met  USE HAND FOR SELF CARES INCLUDING EATING; goal not met.   Patient Goal (s): GET [facility]    PLAN  [x]  Upgrade activities as tolerated     [x]  Continue plan of care  []  Update interventions per flow sheet       []  Discharge due to:_  []  Other:_      Chely Goddard, PT 12/16/2022

## 2022-12-22 ENCOUNTER — HOSPITAL ENCOUNTER (OUTPATIENT)
Dept: PHYSICAL THERAPY | Age: 57
End: 2022-12-22
Payer: MEDICAID

## 2022-12-22 PROCEDURE — 97110 THERAPEUTIC EXERCISES: CPT | Performed by: PHYSICAL THERAPIST

## 2022-12-22 NOTE — PROGRESS NOTES
PT DAILY TREATMENT NOTE - [facility] 2-15    Patient Name: Cisco Childs  Date:2022  : 1965  [x]  Patient  Verified  Payor: Oj Santamaria / Plan: [id number] / Product Type: Managed Care Medicaid /    In time: 5652  am   Out time: 1145 am  Total Treatment Time (min): 45 minutes  Total Timed Codes (min): 40  minutes  1:1 Treatment Time ([address] only): 40 minutes. Visit #:         Treatment Area: Muscle weakness (generalized) [M62.81]    SUBJECTIVE  Pain Level (0-10 scale): right arm sore when it is lifted away from the body, otherwise 0/10  Any medication changes, allergies to medications, adverse drug reactions, diagnosis change, or new procedure performed?: [x] No    [] Yes (see summary sheet for update)  Subjective functional status/changes:   [] No changes reported    OT referral acquired and scheduled for next week. Patient encouraged by hand motion. IOBJECTIVE    40 min Therapeutic Exercise:  [x] See flow sheet :   Rationale: increase strength to improve the patients ability to have functional  and be community walker     min Manual Therapy: PROM, Grade 1 mob    Rationale: increase ROM and increase tissue extensibility to improve the patients ability to tolerate ADLs          With   [x] TE   [] TA   [] neuro   [] other: Patient Education: [x] Review HEP  pullovers, bridging , HC  [] Progressed/Changed HEP based on:   [] positioning   [] body mechanics   [] transfers   [] heat/ice application    [] other:         Pain Level (0-10 scale) post treatment: 0    ASSESSMENT/Changes in Function:   Pt tolerated session very well. Involved R hand in therapy session. Able to pinch rings between thumb and index finger while standing on compliant surface. Moves well between exercises. .   Enthusiastically participates in PT. Patient will continue to benefit from skilled PT services to modify and progress therapeutic interventions to attain remaining goals.          Progress towards goals / Updated goals:  Short Term Goals: To be accomplished in 4 weeks:              IMPROVE RIGHT  TO 10 LB SO SHE CAN HOLD LIGHT OBJECTS; goal not met; arom hand only trace thumb flex  Long Term Goals: To be accomplished in 8 weeks:              COMMUNITY AMBULATOR; walking okay 2 mph w/o assistive device so as of 11/10/22 goal met  USE HAND FOR SELF CARES INCLUDING EATING; goal not met.   Patient Goal (s): GET [facility]    PLAN  [x]  Upgrade activities as tolerated     [x]  Continue plan of care  []  Update interventions per flow sheet       []  Discharge due to:_  []  Other:_      April Agusto-Radha, PT 12/22/2022

## 2022-12-23 ENCOUNTER — APPOINTMENT (OUTPATIENT)
Dept: PHYSICAL THERAPY | Age: 57
End: 2022-12-23
Payer: MEDICAID

## 2022-12-28 ENCOUNTER — APPOINTMENT (OUTPATIENT)
Dept: PHYSICAL THERAPY | Age: 57
End: 2022-12-28
Payer: MEDICAID

## 2022-12-28 PROCEDURE — 97110 THERAPEUTIC EXERCISES: CPT

## 2022-12-28 NOTE — PROGRESS NOTES
PT DAILY TREATMENT NOTE - [facility] 2-15    Patient Name: Amanda Padilla  Date:2022  : 1965  [x]  Patient  Verified  Payor: Anny Fee / Plan: [employer] / Product Type: Managed Care Medicaid /    In time: 905  am   Out time: 950 am  Total Treatment Time (min): 45 minutes  Total Timed Codes (min): 40  minutes  1:1 Treatment Time ( only): 40 minutes. Visit #:     10/24    Treatment Area: Muscle weakness (generalized) [M62.81]    SUBJECTIVE  Pain Level (0-10 scale): right arm sore when it is lifted away from the body, otherwise 0/10  Any medication changes, allergies to medications, adverse drug reactions, diagnosis change, or new procedure performed?: [x] No    [] Yes (see summary sheet for update)  Subjective functional status/changes:   [] No changes reported    Pt states that she has been feeling good overall and can notice an improvement with her walking. She states that she is able to complete bed mobility independently. Pt declines any recent falls or LOB. IOBJECTIVE    40 min Therapeutic Exercise:  [x] See flow sheet :   Rationale: increase strength to improve the patients ability to have functional  and be community walker     min Manual Therapy: PROM, Grade 1 mob    Rationale: increase ROM and increase tissue extensibility to improve the patients ability to tolerate ADLs          With   [x] TE   [] TA   [] neuro   [] other: Patient Education: [x] Review HEP  pullovers, bridging , HC  [] Progressed/Changed HEP based on:   [] positioning   [] body mechanics   [] transfers   [] heat/ice application    [] other:          strength: RUE 2#  AMPAC: 27.25% (improvement)    Pain Level (0-10 scale) post treatment: 0    ASSESSMENT/Changes in Function:   Pt continues to show improvement in both dynamic and static standing balance. She is able to complete activities without any LOB or difficulty.  Pt has been able to complete ADLs with little difficulty, and continues to show improvement with activation with hand AROM. Patient will continue to benefit from skilled PT services to modify and progress therapeutic interventions to attain remaining goals. Progress towards goals / Updated goals:  Short Term Goals: To be accomplished in 4 weeks:              IMPROVE RIGHT  TO 10 LB SO SHE CAN HOLD LIGHT OBJECTS; goal not met; arom hand only trace thumb flex  Long Term Goals: To be accomplished in 8 weeks:              COMMUNITY AMBULATOR; walking okay 2 mph w/o assistive device so as of 11/10/22 goal met  USE HAND FOR SELF CARES INCLUDING EATING; goal not met.   Patient Goal (s): GET [facility]    PLAN  [x]  Upgrade activities as tolerated     [x]  Continue plan of care  []  Update interventions per flow sheet       []  Discharge due to:_  []  Other:_      Ephraim Rouse., PTA 12/28/2022

## 2022-12-28 NOTE — PROGRESS NOTES
PT Progress Note After 10 Visits. 2-15    Patient Name: Yelitza Hall  Date:2022  : 1965  Treatment Area: Muscle weakness (generalized) [M62.81]  22 CVA  SUBJECTIVE  Pain Level (0-10 scale): right arm sore when it is lifted away from the body, otherwise 0/10  Pt states that she has been feeling good overall and can notice an improvement with her walking. She states that she is able to complete bed mobility independently. Pt declines any recent falls or LOB. IOBJECTIVE     strength: RUE 2LB. STARTS OT NEXT WEEK. AMPAC: 27.25% (improvement)    ASSESSMENT/Changes in Function:   Pt continues to show improvement in both dynamic and static standing balance. She is able to complete activities without any LOB or difficulty. Pt has been able to complete ADLs with little difficulty, and continues to show improvement with activation with hand AROM. Patient will continue to benefit from skilled PT services to modify and progress therapeutic interventions to attain remaining goals. Progress towards goals / Updated goals:  Short Term Goals: To be accomplished in 4 weeks:              IMPROVE RIGHT  TO 10 LB SO SHE CAN HOLD LIGHT OBJECTS; goal not met; arom hand only trace thumb flex  Long Term Goals: To be accomplished in 8 weeks:              COMMUNITY AMBULATOR; walking okay 2 mph w/o assistive device so as of 11/10/22 goal met  USE HAND FOR SELF CARES INCLUDING EATING; goal not met.   Patient Goal (s): GET [facility]    PLAN  [x]  Upgrade activities as tolerated     [x]  Continue plan of care  []  Update interventions per flow sheet       []  Discharge due to:_  []  Other:_      Mel Lezama., PTA 2022

## 2023-01-05 ENCOUNTER — APPOINTMENT (OUTPATIENT)
Dept: PHYSICAL THERAPY | Age: 58
End: 2023-01-05

## 2023-01-06 ENCOUNTER — APPOINTMENT (OUTPATIENT)
Dept: PHYSICAL THERAPY | Age: 58
End: 2023-01-06

## 2023-01-18 ENCOUNTER — APPOINTMENT (OUTPATIENT)
Dept: PHYSICAL THERAPY | Age: 58
End: 2023-01-18

## 2023-01-20 ENCOUNTER — APPOINTMENT (OUTPATIENT)
Dept: PHYSICAL THERAPY | Age: 58
End: 2023-01-20

## 2023-02-08 ENCOUNTER — APPOINTMENT (OUTPATIENT)
Dept: PHYSICAL THERAPY | Age: 58
End: 2023-02-08
Payer: MEDICAID

## 2023-02-09 ENCOUNTER — APPOINTMENT (OUTPATIENT)
Dept: PHYSICAL THERAPY | Age: 58
End: 2023-02-09
Payer: MEDICAID

## 2023-02-15 ENCOUNTER — HOSPITAL ENCOUNTER (OUTPATIENT)
Dept: PHYSICAL THERAPY | Age: 58
Discharge: HOME | End: 2023-02-15
Payer: MEDICAID

## 2023-02-15 PROCEDURE — 97166 OT EVAL MOD COMPLEX 45 MIN: CPT

## 2023-02-15 NOTE — THERAPY EVALUATION
[address] [facility], [address] [facility]  [place]  Ph: [phone]   Fax: [phone]    Initial Evaluation/Plan of Care/Statement of Necessity for Occupational Therapy Services     Patient name: [place]   : 1965  [x]  Patient  Verified Provider#: [id number]    Start of Care:  2/15/23        Referral source: Shaheen Ames MD Return visit to MD: 2023     Medical/Treatment Diagnosis: Muscle weakness (generalized) [M62.81]    Payor: Baxter Alpers / Plan: Claire Yousif / Product Type: Managed Care Medicaid /       Prior Hospitalization: see medical history     Comorbidities: see intake form  Prior Level of Function: independent with all tasks prior to the CVA  Medications: Verified on Patient Summary List          Patient / Family readiness to learn indicated by: asking questions, trying to perform skills, and interest  Persons(s) to be included in education: patient (P)  Barriers to Learning/Limitations: None  Patient Self Reported Health Status: excellent  Rehabilitation Potential: good  Previous Treatment/Compliance: outpatient PT (10 -2022); [facility]--inpatient rehab services  PMHx/Surgical Hx: see intake form  Work Hx: n/a. Worked in  prior to the stroke  Living Situation: lives with daughter; single story home  Barriers to progress: none  Motivation: very good  Substance use: none reported  Cognition: A & O x 4   Onset Date: 2022    In time:255   Out time:345  Total Treatment Time (min): 50     Visit #: 1     SUBJECTIVE  Mechanism of Injury:  Patient states in the evening  of 22, she experienced speech difficulties and her face was \"twisted. \"  Patient states she put ice on her face and  went to bed. In the morning, patient attempted to get out of bed but noticed her right hand would not work and  she was dragging her leg. Patient present to [facility] on 22.   Tests indicated  a stroke-- Acute ischemic stroke in the left corona radiata . Patient discharged on 9/8/22  to [facility] where she received inpatient rehab services for 1 - 2 weeks. Once discharged, patient was referred to outpatient PT services (October - December 2022). Patient now being referred to outpatient OT services to improve the ability to use the right arm. Patient does note some mild difficulties with speech (slurred) which is getting better.          PAIN:  Area of pain: none reported     Pain Level (0-10 scale) pre treatment: 0 Pain Level (0-10 scale) post treatment: 0      OBJECTIVE    Objective/Functional Measures:     ADL/IADLs:    Eating:  uses L hand to eat; dependent with cutting food     UB Dressing:  mod I , increased time     LB  Dressing: assistance tying shoes              Bathing:  min A --LUE  Meal Prep:  light cooking (sandwich)   Laundry: puts clothing in washer/dryer; dependent with drying clothes  Household chores     Driving:  dependent  Medication Management: set up   Handwriting:    left hand      Sleep: no problems noted    DME/AE: SPC used on occasion; LH sponge  Orthotics/Prosthetics: none    Mobility:  mod I  Transfers: independent    VISION:   --reading glasses only used at this time  --no visual changes noted  by the patient since the stroke    --will screen/further assess if/as indicated during upcoming visits      Upper Extremity Assessment:    Hand Dominance: right    Opposition:    Right: to radial DIP crease of the IF   Left: intact     Measurements: Taken with [employer] Dynamometer, in lbs   Level 2 DATE  2/15 DATE DATE DATE DATE   Right 5       Left 45         Pinch Measurements: Taken with Pinch Gauge, in lbs    Date  2/15 Date Date   Lateral      Right 1     Left 14     Tip      Right N/a     Left 10       Fine Motor:   --patient able to  and release a 1 inch cube    SENSATION  Light Touch [x]WFL,   RUE        [] Impaired    Pain/Temperature [x]WFL, RUE          [] Impaired        Muscle Tone: RUE--hand/wrist  MAS:1+ to 2    Edema:      EDEMA Measurements: In mm Right  2/15 Left  2/15 Right Left Right Left Right Left   DPC 19.7 19.0         Wrist Crease 16.8 16.0                         ROM:       Active/Passive Active/Passive   DATE:  2/15 2/15       Right Left Right Left   Shoulder Flex 40       Ext 38       abd 56       IR tailbone      Elbow Flex 120       Ext -6      Forearm Supination WFL       Pronation WFL      Wrist Flex 58       Ext -40       Ulnar Dev Poor-       Radial Dev Fair-          ASSESSMENT/Changes in Function:   Patient is a 60 y/o right hand dominant female referred to occupational therapy services due to limitations engaging in daily activities as a result of late effect CVA with right side weakness. Patient currently uses her left hand to perform all daily tasks. Right finger/wrist movement emerging. Impairments noted; decreased RUE ROM, decreased RUE strength, impaired R hand dexterity/coordination, decreased activity tolerance, R hand edema, and decreased RUE joint flexibility/mobility, mild apraxia. Feel patient will benefit from skilled OT services to address limitations, promote maximal level of function, improve QOL, decrease burden of care. Ampac:  79 %    Problem List/Impairments: Decreased range of motion, Decreased strength, Edema effecting function, Decreased coordination/prehension, Decreased ADL/functional abilities , Decreased activity tolerance, and Decreased flexibility/joint mobility    Frequency / Duration: Patient to be seen 2 times per week for 12 weeks.     Certification Period: 2/15/23 - 5/13/23    Treatment Plan may include any combination of the following: Therapeutic exercise, Therapeutic activities, Neuromuscular re-education, Manual therapy, Splinting/orthoses, Patient education, and ADLs/IADLs    Patient/ Caregiver education and instruction: brace/ splint application    Patient Goal (s): to be able to do things with my right hand\"    Short Term Goals: To be accomplished in 5-6 weeks:   1. Patient will be mod I with her home exercise program to promote gains between sessions    [] Met [] Not met [] Partially met     2. Patient will be able to use her R hand at mealtimes (up to 50 % ) for eating finger foods, holding napkin, etc.    [] Met [] Not met [] Partially met     3. Patient will be able to bathe at a Mod I level, using AE/adaptive techniques as needed     [] Met [] Not met [] Partially met      Long Term Goals: To be accomplished in 10-12 weeks:   1. Patient will be mod I with grooming tasks    [] Met [] Not met [] Partially met     2. Patient will demonstrate a right hand  strength of > 12 pounds to decrease difficulty opening containers, holding items during IADLs    [] Met [] Not met [] Partially met     3. Patient will demonstrate a right lateral pinch strength of  4 + lbs to increase independence performing dressing tasks such as pulling up socks,pants    [] Met [] Not met [] Partially met     4. Patient will be compliant and independent with use/wear/care  of a right orthotic if obtained during course of therapy    [] Met [] Not met [] Partially met      [x]  Plan of care will be reviewed with MONTIEL if/as indicated. The Plan of Care is based on information from the initial evaluation. [address] [facility], OTR/L 2/15/2023   ________________________________________________________________________    I certify that the above Therapy Services are being furnished while the patient is under my care. I agree with the treatment plan and certify that this therapy is necessary.     [de-identified] Signature:____________________  Date:____________Time: _________

## 2023-02-22 ENCOUNTER — HOSPITAL ENCOUNTER (OUTPATIENT)
Dept: PHYSICAL THERAPY | Age: 58
Discharge: HOME | End: 2023-02-22
Payer: MEDICAID

## 2023-02-22 PROCEDURE — 97110 THERAPEUTIC EXERCISES: CPT

## 2023-02-22 NOTE — PROGRESS NOTES
OT DAILY TREATMENT NOTE  3-16    Patient Name: Ken Burnett  Date:2023  : 1965  [x]  Patient  Verified  Payor: Shalonda Cameron / Plan: Kamila Love / Product Type: Managed Care Medicaid /    In time: 802  Out time: 1000  Total Treatment Time (min): 45  Visit #: 2     Treatment Area: Muscle weakness (generalized) [M62.81]    SUBJECTIVE  Pain Level in    (0-10 scale): 0  Pain Level out  (0-10 scale): 0      Next MD appointment: 2023    Any medication changes, allergies to medications, adverse drug reactions, diagnosis change, or new procedure performed?: [x] No    [] Yes (see summary sheet for update)  Subjective functional status/changes:   [x] No changes reported  --patient states e-stim was used on her R shoulder during her inpatient rehab therapy    OBJECTIVE    45 min Therapeutic Exercise:  [x] See flow sheet :   Rationale: increase ROM and improve coordination to improve the patients ability to use the right hand as an assist during ADL/IADLs    With   [x] TE   [] TA   [] neuro   [] other:   []  SC Patient Education: [x]  HEP-pink t-foam cube for hand strengthening /Ue stretch    [] Progressed/Changed HEP based on:   [] positioning   [] body mechanics   [] transfers   [] heat/ice application [] edema management  [] Splint wear/care   [] Sensory re-education   [] scar management      [] other:      Objective Data;    Box & Block (standing),  RUE  6 blocks              Patient tolerance to treatment:  [] poor  [] fair  [x] good  []  excellent   ASSESSMENT/Changes in Function:   Hypertonicity  present in the R hand  limits patient's ability to actively extend  fully her right fingers during grasp/release tasks. Mobility/strength  is emerging in the right hand, radial > ulnar. Patient able to grasp/ an 1 inch object using and inferior pinch. Will trial use of a Saeboflex splint with patient  during upcoming visits--will take measurements next session.    Patient will continue to benefit from skilled OT services to address Decreased range of motion, Decreased strength, Edema effecting function, Decreased coordination/prehension, Decreased ADL/functional abilities , Decreased activity tolerance, and Decreased flexibility/joint mobility to attain remaining goals. [x]  See Plan of Care  []  See progress note/recertification  []  See Discharge Summary         Progress towards goals / Updated goals:  Patient Goal (s): to be able to do things with my right hand\"     Short Term Goals: To be accomplished in 5-6 weeks:              1.  Patient will be mod I with her home exercise program to promote gains between sessions                          [] Met [] Not met [] Partially met                2.  Patient will be able to use her R hand at mealtimes (up to 50 % ) for eating finger foods, holding napkin, etc.                          [] Met [] Not met [] Partially met                3.  Patient will be able to bathe at a Mod I level, using AE/adaptive techniques as needed                           [] Met [] Not met [] Partially met       Long Term Goals:  To be accomplished in 10-12 weeks:              1.  Patient will be mod I with grooming tasks                          [] Met [] Not met [] Partially met                2.  Patient will demonstrate a right hand  strength of > 12 pounds to decrease difficulty opening containers, holding items during IADLs                          [] Met [] Not met [] Partially met                3.  Patient will demonstrate a right lateral pinch strength of  4 + lbs to increase independence performing dressing tasks such as pulling up socks,pants                          [] Met [] Not met [] Partially met                4.  Patient will be compliant and independent with use/wear/care  of a right orthotic if obtained during course of therapy                          [] Met [] Not met [] Partially met      PLAN  [x]  Upgrade activities as tolerated     [x] Continue plan of care  []  Update interventions per flow sheet       []  Discharge due to:_  [x]  Other: Saeboflex splint measurements      Mohaveamos Schaeffer OTR/L 2/22/2023

## 2023-02-24 ENCOUNTER — HOSPITAL ENCOUNTER (OUTPATIENT)
Dept: PHYSICAL THERAPY | Age: 58
Discharge: HOME | End: 2023-02-24
Payer: MEDICAID

## 2023-02-24 PROCEDURE — 97110 THERAPEUTIC EXERCISES: CPT

## 2023-02-24 NOTE — PROGRESS NOTES
OT DAILY TREATMENT NOTE - [facility]     Patient Name: Shonna Putnam  Date:2023  : 1965  [x]  Patient  Verified  Payor: Balta Min / Plan: Anthony Tariq / Product Type: Managed Care Medicaid /    In time:924  Out time:1005  Total Treatment Time (min): 41  Total Timed Codes (min): 41    Visit #: 3    Treatment Area: Muscle weakness (generalized) [M62.81]    SUBJECTIVE  Pain Level in    (0-10 scale): 0  Pain Level out  (0-10 scale): 0       Next MD appointment: unknown    Any medication changes, allergies to medications, adverse drug reactions, diagnosis change, or new procedure performed?: [x] No    [] Yes (see summary sheet for update)  Subjective functional status/changes:   [x] No changes reported  --patient states she had \"a good workout\" today at end of OT session    OBJECTIVE    41 min Therapeutic Exercise:  [x] See flow sheet :   Rationale: increase ROM, increase strength, and improve coordination to improve the patients ability to use the RUE functionally /as an assist during ADL/IADLs      With   [x] TE   [] TA   [] neuro   [] other:   [] SC Patient Education: [] Initiate HEP  [] Review HEP  [x] Progressed HEP based on: RUE stretches  [] positioning   [] body mechanics   [] transfers   [] heat/ice use   [] Splint wear/care   [] Sensory re-education   [] scar management      [] edema management   []  other:               Patient tolerance to treatment:  [] poor  [] fair  [x] good  []  excellent     ASSESSMENT/Changes in Function:     Measurements taken for trial use of Saeboflex splint during upcoming tx sessions. Feel patient may be a good candidate for splint use due to emerging right hand strength and active finger extension, radial > ulnar side. Progressed UE Hep to include additional RUE stretches for the wrist--patient mod I with redemonstration.     Patient will continue to benefit from skilled OT services to address Decreased range of motion, Decreased strength, Edema effecting function, Decreased coordination/prehension, Decreased ADL/functional abilities , Decreased activity tolerance, and Decreased flexibility/joint mobility  to attain remaining goals. [x]  See Plan of Care  []  See progress note/recertification  []  See Discharge Summary         Progress towards goals / Updated goals:  Patient Goal (s): to be able to do things with my right hand\"     Short Term Goals: To be accomplished in 5-6 weeks:              1.  Patient will be mod I with her home exercise program to promote gains between sessions                          [] Met [] Not met [] Partially met                2.  Patient will be able to use her R hand at mealtimes (up to 50 % ) for eating finger foods, holding napkin, etc.                          [] Met [] Not met [] Partially met                3.  Patient will be able to bathe at a Mod I level, using AE/adaptive techniques as needed                           [] Met [] Not met [] Partially met       Long Term Goals:  To be accomplished in 10-12 weeks:              1.  Patient will be mod I with grooming tasks                          [] Met [] Not met [] Partially met                2.  Patient will demonstrate a right hand  strength of > 12 pounds to decrease difficulty opening containers, holding items during IADLs                          [] Met [] Not met [] Partially met                3.  Patient will demonstrate a right lateral pinch strength of  4 + lbs to increase independence performing dressing tasks such as pulling up socks,pants                          [] Met [] Not met [] Partially met                4.  Patient will be compliant and independent with use/wear/care  of a right orthotic if obtained during course of therapy                          [] Met [] Not met [] Partially met      PLAN  [x]  Upgrade activities as tolerated     [x]  Continue plan of care  []  Update interventions per flow sheet       []  Discharge due to:_  [] Other:_      Markos Devi, OTR/L 2/24/2023

## 2023-03-01 ENCOUNTER — APPOINTMENT (OUTPATIENT)
Dept: PHYSICAL THERAPY | Age: 58
End: 2023-03-01
Payer: MEDICAID

## 2023-03-03 ENCOUNTER — HOSPITAL ENCOUNTER (OUTPATIENT)
Dept: PHYSICAL THERAPY | Age: 58
Discharge: HOME | End: 2023-03-03
Payer: MEDICAID

## 2023-03-03 PROCEDURE — 97110 THERAPEUTIC EXERCISES: CPT

## 2023-03-03 PROCEDURE — 97530 THERAPEUTIC ACTIVITIES: CPT

## 2023-03-03 NOTE — PROGRESS NOTES
OT DAILY TREATMENT NOTE  3-16    Patient Name: Edouard Santos  Date:3/3/2023  : 1965  [x]  Patient  Verified  Payor: Jean Gu / Plan: Gricelda Chahal / Product Type: Managed Care Medicaid /    In time: 092  Out time: 1005  Total Treatment Time (min): 45  Visit #: 4     Treatment Area: Muscle weakness (generalized) [M62.81]    SUBJECTIVE  Pain Level in    (0-10 scale): 0  Pain Level out  (0-10 scale): 0      Next MD appointment: 2023    Any medication changes, allergies to medications, adverse drug reactions, diagnosis change, or new procedure performed?: [x] No    [] Yes (see summary sheet for update)  Subjective functional status/changes:   [x] No changes reported  --patient states she recently has been having medial R knee pain. Patient states she walks a lot--in stores, the mall, etc.     OBJECTIVE    35 min Therapeutic Exercise:  [x] See flow sheet :   Rationale: increase ROM and increase strength to improve the patients ability to engage in ADL/IADLs with less difficulty , using the [place] as an assist    10 min Therapeutic Activity:  [x]  See flow sheet :   Rationale: increase ROM and increase strength  to improve the patients ability to use the R hand as an assist when opening containers    With   [x] TE   [] TA   [] neuro   [] other:   []  SC Patient Education: [x]  HEP -reviewed   [x] Progressed/Changed HEP based on:   [] positioning   [] body mechanics   [] transfers   [] heat/ice application [] edema management  [] Splint wear/care   [] Sensory re-education   [] scar management      [] other:                    Patient tolerance to treatment:  [] poor  [] fair  [x] good  []  excellent   ASSESSMENT/Changes in Function:   Patient tolerating e-stim to right wrist/finger extensors: good response as noted by <'ed tone in right fingers, > ability to actively extend the fingers.   Feel patient would benefit from a wrist/finger extension orthosis to minimize/prevent soft tissue shortening, > ROM/function in the R hand. Patient will continue to benefit from skilled OT services to address Decreased range of motion, Decreased strength, Edema effecting function, Decreased coordination/prehension, Decreased ADL/functional abilities , Decreased activity tolerance, and Decreased flexibility/joint mobility to attain remaining goals. [x]  See Plan of Care  []  See progress note/recertification  []  See Discharge Summary         Progress towards goals / Updated goals:  Patient Goal (s): to be able to do things with my right hand\"     Short Term Goals: To be accomplished in 5-6 weeks:              1.  Patient will be mod I with her home exercise program to promote gains between sessions                          [] Met [] Not met [x] Partially met                2.  Patient will be able to use her R hand at mealtimes (up to 50 % ) for eating finger foods, holding napkin, etc.                          [] Met [] Not met [] Partially met                3.  Patient will be able to bathe at a Mod I level, using AE/adaptive techniques as needed                           [] Met [] Not met [] Partially met       Long Term Goals:  To be accomplished in 10-12 weeks:              1.  Patient will be mod I with grooming tasks                          [] Met [] Not met [] Partially met                2.  Patient will demonstrate a right hand  strength of > 12 pounds to decrease difficulty opening containers, holding items during IADLs                          [] Met [] Not met [] Partially met                3.  Patient will demonstrate a right lateral pinch strength of  4 + lbs to increase independence performing dressing tasks such as pulling up socks,pants                          [] Met [] Not met [] Partially met                4.  Patient will be compliant and independent with use/wear/care  of a right orthotic if obtained during course of therapy                          [] Met [] Not met [] Partially met      PLAN  [x]  Upgrade activities as tolerated     [x]  Continue plan of care  []  Update interventions per flow sheet       []  Discharge due to:_  [x]  Other: splint; eating tasks      Lynn Schaeffer OTR/L 3/3/2023

## 2023-03-10 ENCOUNTER — APPOINTMENT (OUTPATIENT)
Dept: PHYSICAL THERAPY | Age: 58
End: 2023-03-10
Payer: MEDICAID

## 2023-03-17 ENCOUNTER — HOSPITAL ENCOUNTER (OUTPATIENT)
Dept: PHYSICAL THERAPY | Age: 58
Discharge: HOME | End: 2023-03-17
Payer: MEDICAID

## 2023-03-17 PROCEDURE — 97110 THERAPEUTIC EXERCISES: CPT

## 2023-03-17 PROCEDURE — 97535 SELF CARE MNGMENT TRAINING: CPT

## 2023-03-17 NOTE — PROGRESS NOTES
OT DAILY TREATMENT NOTE  3-16    Patient Name: Ilene Aguirre  Date:3/17/2023  : 1965  [x]  Patient  Verified  Payor: Juancarlos Gonsales / Plan: Jackelyn Thomas / Product Type: Managed Care Medicaid /    In time: 5472  Out time: 1050  Total Treatment Time (min): 42  Visit #: 5     Treatment Area: Muscle weakness (generalized) [M62.81]    SUBJECTIVE  Pain Level in    (0-10 scale): 0  Pain Level out  (0-10 scale): 0      Next MD appointment:n/a    Any medication changes, allergies to medications, adverse drug reactions, diagnosis change, or new procedure performed?: [x] No    [] Yes (see summary sheet for update)  Subjective functional status/changes:   [x] No changes reported  --patient feels like her right arm/hand is getting stronger. OBJECTIVE    30 min Therapeutic Exercise:  [x] See flow sheet :   Rationale: increase ROM and increase strength to improve the patients ability to use the right hand functionally during ADL tasks      10 min Self Care/Home Management: see flow sheet   Rationale: increase ROM and improve coordination  to improve the patients ability to use the right hand for ADL tasks such as bathing and eating.      With   [x] TE   [] TA   [] neuro   [] other:   [x]  SC Patient Education: []  HEP    [x] Progressed/Changed HEP based on: added IR stretch/ex    [] positioning   [] body mechanics   [] transfers   [] heat/ice application [] edema management  [] Splint wear/care   [] Sensory re-education   [] scar management      [x] other: using R hand for finger food, other eating activities            Other Objective/Functional Measures:     Measurements: Taken with [employer]on Dynamometer, in lbs   Level 2 DATE  2/15 DATE  3/17 DATE DATE DATE   Right 5  7         Left 45 N/t             Pinch Measurements: Taken with Pinch Gauge, in lbs     Date  2/15 Date  3/17 Date   Lateral         Right 1  2*     Left 14 N/t      Tip         Right N/a 2 *     Left 10  n/t     Resistance  2 pound clothespin used    ROM:                  Active Active   DATE:   2/15 3/17           Right right Right Left   Shoulder Flex 40  38 scaption         Ext 38  47         abd 56  80         IR tailbone  tailbone       Wrist Flex 58  n/t         Ext -40  -30         Patient tolerance to treatment:  [] poor  [] fair  [x] good  []  excellent     ASSESSMENT/Changes in Function:   Patient seen for 30 day re-assessment--see above for details. Patient demonstrates small gain in right hand  and pinch strength as well as gains noted in right shoulder and wrist RoM. Patient noted to walk with hip hiking, minimal knee flexion (RLE). --patient asking if she will always walk this way. Feel patient may benefit from additional Pt services to address difficulty when walking. Will trial use of Saeboflex splint in upcoming therapy sessions. Patient will continue to benefit from skilled OT services to address Decreased range of motion, Decreased strength, Decreased coordination/prehension, Decreased ADL/functional abilities , Decreased activity tolerance, and Decreased flexibility/joint mobility to attain remaining goals. []  See Plan of Care  [x]  See progress note/recertification  []  See Discharge Summary         Progress towards goals / Updated goals:  Patient Goal (s): to be able to do things with my right hand\"     Short Term Goals: To be accomplished in 5-6 weeks:              1.  Patient will be mod I with her home exercise program to promote gains between sessions                          [] Met [] Not met [x] Partially met                2.  Patient will be able to use her R hand at mealtimes (up to 50 % ) for eating finger foods, holding napkin, etc.                          [] Met [] Not met [] Partially met                3.  Patient will be able to bathe at a Mod I level, using AE/adaptive techniques as needed                           [] Met [] Not met [] Partially met       Long Term Goals:  To be accomplished in 10-12 weeks:              1.  Patient will be mod I with grooming tasks                          [] Met [] Not met [] Partially met                2.  Patient will demonstrate a right hand  strength of > 12 pounds to decrease difficulty opening containers, holding items during IADLs                          [] Met [] Not met [] Partially met                3.  Patient will demonstrate a right lateral pinch strength of  4 + lbs to increase independence performing dressing tasks such as pulling up socks,pants                          [] Met [] Not met [] Partially met                4.  Patient will be compliant and independent with use/wear/care  of a right orthotic if obtained during course of therapy                          [] Met [] Not met [] Partially met      PLAN  [x]  Upgrade activities as tolerated     [x]  Continue plan of care  []  Update interventions per flow sheet       []  Discharge due to:_  []  Other:_      [address] [facility], BER/L 3/17/2023

## 2023-03-17 NOTE — PROGRESS NOTES
[address] [address] [facility] [address] [facility], [address] [place] [address]  Ph: [phone]    Fax: [phone]    Occupational Therapy Progress Report  Name: Sarah Levine   : 1965   MD: Adriana Gilmore MD     Treatment Diagnosis: Muscle weakness (generalized) [M62.81]    Start of Care: 2/15/23  Visits from Start of Care: 5  Missed Visits: 0  Certification Period: 2/15/23 - 23    Frequency/Duration: 2 times a week for 12 weeks   Am-Pac:60 %    Summary of Care/Goals:   Patient has been seen by Occupational therapy services for 4 visits since evaluation on 2/15/23 to address limitations engaging in daily activities following a CVA in 2022. Patient recently seen for monthly re-assessment--see below for details. Patient demonstrates a small gain in both  right hand  and pinch strength. Improvement also  noted in right shoulder and wrist RoM. Patient observed  to walk with hip hiking, minimal knee flexion (RLE). --patient asking if she will always walk this way. Feel patient may benefit from re-consult to PT services to address difficulty when walking. Will trial use of Saeboflex splint in upcoming therapy sessions to promote right hand movement/strength/function. Patient is progressing towards goals. Feel patient will continue to benefit from skilled OT services to address limitations, promote maximal level of function.            Measurements: Taken with [employer]on Dynamometer, in lbs   Level 2 DATE  2/15 DATE  3/17 DATE DATE DATE   Right 5  7         Left 45 N/t             Pinch Measurements: Taken with Pinch Gauge, in lbs     Date  2/15 Date  3/17 Date   Lateral         Right 1  2*     Left 14 N/t      Tip         Right N/a 2 *     Left 10  n/t     Resistance  2 pound clothespin used     ROM:                        Active Active   DATE:   2/15 3/17           Right right Right Left   Shoulder Flex 40  38 scaption         Ext 38  47         abd 56  80 VADIM guerrero  S2       Wrist Flex 58  n/t         Ext -40  -30           Patient Goal (s): to be able to do things with my right hand\"     Short Term Goals: To be accomplished in 5-6 weeks:              1.  Patient will be mod I with her home exercise program to promote gains between sessions                          [] Met [] Not met [x] Partially met                2.  Patient will be able to use her R hand at mealtimes (up to 50 % ) for eating finger foods, holding napkin, etc.-discussed 3/17                          [] Met [] Not met [] Partially met                3.  Patient will be able to bathe at a Mod I level, using AE/adaptive techniques as needed --progressing, Assist with LUE, 3/17                          [] Met [] Not met [x] Partially met       Long Term Goals: To be accomplished in 10-12 weeks:              1.  Patient will be mod I with grooming tasks                          [] Met [] Not met [] Partially met                2.  Patient will demonstrate a right hand  strength of > 12 pounds to decrease difficulty opening containers, holding items during IADLs-progressing 3/17                          [] Met [] Not met [] Partially met                3.  Patient will demonstrate a right lateral pinch strength of  4 + lbs to increase independence performing dressing tasks such as pulling up socks,pants                          [] Met [] Not met [] Partially met                4.  Patient will be compliant and independent with use/wear/care  of a right orthotic if obtained during course of therapy                          [] Met [] Not met [] Partially met        Recommendations: continue OT services per current POC      [x]  Plan of care will be  reviewed with MONTIEL if appropriate. Abdulaziz Qiu OTR/KIRA 3/17/2023     ________________________________________________________________________     Please retain this original for your records.

## 2023-03-22 ENCOUNTER — HOSPITAL ENCOUNTER (OUTPATIENT)
Dept: PHYSICAL THERAPY | Age: 58
Discharge: HOME | End: 2023-03-22
Payer: MEDICAID

## 2023-03-22 PROCEDURE — 97110 THERAPEUTIC EXERCISES: CPT

## 2023-03-22 PROCEDURE — 97530 THERAPEUTIC ACTIVITIES: CPT

## 2023-03-22 NOTE — PROGRESS NOTES
OT DAILY TREATMENT NOTE  3-    Patient Name: Yanira Stark  Date:3/22/2023  : 1965  [x]  Patient  Verified  Payor: Abhinav Dominique / Plan: [employer] / Product Type: Managed Care Medicaid /    In time:   Out time: 1204  Total Treatment Time (min): 50  Visit #: 6     Treatment Area: Muscle weakness (generalized) [M62.81]    SUBJECTIVE  Pain Level in    (0-10 scale): 0  Pain Level out  (0-10 scale): 0      Next MD appointment: n/a    Any medication changes, allergies to medications, adverse drug reactions, diagnosis change, or new procedure performed?: [x] No    [] Yes (see summary sheet for update)  Subjective functional status/changes:   [] No changes reported  --Patient states she feels as if her right hand/arm is getting better/stronger    OBJECTIVE    27 min Therapeutic Exercise:  [x] See flow sheet :   Rationale: increase ROM and increase strength to improve the patients ability to use the RUE for ADL/IADLs  (- 3 min for Nu step)    20 min Therapeutic Activity:  [x]  See flow sheet :   Rationale: increase ROM and increase strength  to improve the patients ability to independently engage in ADL/IADL's using the RUE    With   [] TE   [x] TA   [] neuro   [] other:   []  SC Patient Education: []  HEP    [] Progressed/Changed HEP based on:   [x] positioning   [x] body mechanics   [] transfers   [] heat/ice application [] edema management  [] Splint wear/care   [] Sensory re-education   [] scar management      [] other:                Patient tolerance to treatment:  [] poor  [] fair  [x] good  []  excellent   ASSESSMENT/Changes in Function:   Trial use of Saeboflex splint today--patient able to perform 10 grasp/release repetitions using yellow (light) spring. Will continue to use Saeboflex splint during tx sessions to promote > ROM, strength in the [place]. Patient feels she is becoming stronger in the RUE--able to squeeze the hand of people.    Patient will continue to benefit from skilled OT services to address Decreased range of motion, Decreased strength, Decreased coordination/prehension, Decreased ADL/functional abilities , Decreased activity tolerance, and Decreased flexibility/joint mobility to attain remaining goals. [x]  See Plan of Care  []  See progress note/recertification  []  See Discharge Summary         Progress towards goals / Updated goals:  Patient Goal (s): to be able to do things with my right hand\"     Short Term Goals: To be accomplished in 5-6 weeks:              1.  Patient will be mod I with her home exercise program to promote gains between sessions                          [] Met [] Not met [x] Partially met                2.  Patient will be able to use her R hand at mealtimes (up to 50 % ) for eating finger foods, holding napkin, etc.-discussed 3/17                          [] Met [] Not met [] Partially met                3.  Patient will be able to bathe at a Mod I level, using AE/adaptive techniques as needed --progressing, Assist with LUE, 3/17                          [] Met [] Not met [x] Partially met       Long Term Goals:  To be accomplished in 10-12 weeks:              1.  Patient will be mod I with grooming tasks                          [] Met [] Not met [] Partially met                2.  Patient will demonstrate a right hand  strength of > 12 pounds to decrease difficulty opening containers, holding items during IADLs-progressing 3/17                          [] Met [] Not met [] Partially met                3.  Patient will demonstrate a right lateral pinch strength of  4 + lbs to increase independence performing dressing tasks such as pulling up socks,pants                          [] Met [] Not met [] Partially met                4.  Patient will be compliant and independent with use/wear/care  of a right orthotic if obtained during course of therapy                          [] Met [] Not met [] Partially met      PLAN  [x]  Upgrade activities as tolerated     [x]  Continue plan of care  []  Update interventions per flow sheet       []  Discharge due to:_  [x]  Other:_ continue with EREN Love 3/22/2023

## 2023-03-24 ENCOUNTER — HOSPITAL ENCOUNTER (OUTPATIENT)
Dept: PHYSICAL THERAPY | Age: 58
Discharge: HOME | End: 2023-03-24
Payer: MEDICAID

## 2023-03-24 PROCEDURE — 97112 NEUROMUSCULAR REEDUCATION: CPT

## 2023-03-24 PROCEDURE — 97530 THERAPEUTIC ACTIVITIES: CPT

## 2023-03-24 PROCEDURE — 97110 THERAPEUTIC EXERCISES: CPT

## 2023-03-24 NOTE — PROGRESS NOTES
OT DAILY TREATMENT NOTE  3-16    Patient Name: Nikki Oh  Date:3/24/2023  : 1965  [x]  Patient  Verified  Payor: Carlos Ann / Plan: Cindy Parks / Product Type: Managed Care Medicaid /    In time: 836  Out time: 920  Total Treatment Time (min): 44  Visit #: 7     Treatment Area: Muscle weakness (generalized) [M62.81]    SUBJECTIVE  Pain Level in    (0-10 scale): 0  Pain Level out  (0-10 scale): 0      Next MD appointment:n/a    Any medication changes, allergies to medications, adverse drug reactions, diagnosis change, or new procedure performed?: [x] No    [] Yes (see summary sheet for update)  Subjective functional status/changes:   [x] No changes reported  Patient states she likes to give people a hand \"squeeze\" using her R hand      OBJECTIVE    15 min Therapeutic Exercise:  [x] See flow sheet :   Rationale: increase ROM to improve the patients ability to use the RuE as an assist during ADL/IADLs    14 min Therapeutic Activity:  [x]  See flow sheet :   Rationale: increase ROM and increase strength  to improve the patients ability to grasp/hold objects with the right hand during daily activities     15 min Neuromuscular Re-education:  [x]  See flow sheet :   Rationale: increase ROM and improve coordination  to improve the patients ability to use the R hand as as assist during daily activities    With   [x] TE   [] TA   [] neuro   [] other:   []  SC Patient Education: []  HEP    [x] Progressed/Changed HEP based on: RUE stretches for elbow to finger to > RoM  [] positioning   [] body mechanics   [] transfers   [] heat/ice application [] edema management  [] Splint wear/care   [] Sensory re-education   [] scar management      [] other:          Patient tolerance to treatment:  [] poor  [] fair  [] good  []  excellent   ASSESSMENT/Changes in Function:   Minor modifications made to Saeboflex splint to improve fit,grasping ability.   Patient able to perform approximately 10 grasps only before R hand fatigues. Patient will continue to benefit from skilled OT services to address Decreased range of motion, Decreased strength, Decreased coordination/prehension, Decreased ADL/functional abilities , Decreased activity tolerance, and Decreased flexibility/joint mobility to attain remaining goals. [x]  See Plan of Care  []  See progress note/recertification  []  See Discharge Summary         Progress towards goals / Updated goals:  Patient Goal (s): to be able to do things with my right hand\"     Short Term Goals: To be accomplished in 5-6 weeks:              1.  Patient will be mod I with her home exercise program to promote gains between sessions                          [] Met [] Not met [x] Partially met                2.  Patient will be able to use her R hand at mealtimes (up to 50 % ) for eating finger foods, holding napkin, etc.-discussed 3/17                          [] Met [] Not met [] Partially met                3.  Patient will be able to bathe at a Mod I level, using AE/adaptive techniques as needed --progressing, Assist with LUE, 3/17                          [] Met [] Not met [x] Partially met       Long Term Goals:  To be accomplished in 10-12 weeks:              1.  Patient will be mod I with grooming tasks                          [] Met [] Not met [] Partially met                2.  Patient will demonstrate a right hand  strength of > 12 pounds to decrease difficulty opening containers, holding items during IADLs-progressing 3/17                          [] Met [] Not met [] Partially met                3.  Patient will demonstrate a right lateral pinch strength of  4 + lbs to increase independence performing dressing tasks such as pulling up socks,pants                          [] Met [] Not met [] Partially met                4.  Patient will be compliant and independent with use/wear/care  of a right orthotic if obtained during course of therapy [] Met [] Not met [] Partially met      PLAN  [x]  Upgrade activities as tolerated     [x]  Continue plan of care  []  Update interventions per flow sheet       []  Discharge due to:_  []  Other:_      Tara Parks OTR/KIRA 3/24/2023

## 2023-03-28 ENCOUNTER — HOSPITAL ENCOUNTER (OUTPATIENT)
Dept: PHYSICAL THERAPY | Age: 58
End: 2023-03-28
Payer: MEDICAID

## 2023-03-28 PROCEDURE — 97530 THERAPEUTIC ACTIVITIES: CPT

## 2023-03-28 NOTE — PROGRESS NOTES
OT DAILY TREATMENT NOTE  3-16    Patient Name: Aramis Corea  Date:3/28/2023  : 1965  [x]  Patient  Verified  Payor: Eliane South / Plan: [facility] [place] / Product Type: Managed Care Medicaid /    In time: 977  Out time: 495  Total Treatment Time (min): 45  Visit #: 8     Treatment Area: Muscle weakness (generalized) [M62.81]    SUBJECTIVE  Pain Level in    (0-10 scale): 0  Pain Level out  (0-10 scale): 0      Next MD appointment: n/a    Any medication changes, allergies to medications, adverse drug reactions, diagnosis change, or new procedure performed?: [x] No    [] Yes (see summary sheet for update)  Subjective functional status/changes:   [x] No changes reported  --patient states she was able to pull up her bed covers with the right hand    OBJECTIVE    45 min Therapeutic Activity:  [x]  See flow sheet :   Rationale: increase ROM, increase strength, and improve coordination  to improve the patients ability to engage in daily activities while using the RUE as an asisst    With   [] TE   [x] TA   [] neuro   [] other:   []  SC Patient Education: []  HEP    [x] Progressed/Changed HEP based on: > RoM, coordination using functional activities  [] positioning   [] body mechanics   [] transfers   [] heat/ice application [] edema management  [] Splint wear/care   [] Sensory re-education   [] scar management      [] other:                 Patient tolerance to treatment:  [] poor  [] fair  [x] good  []  excellent   ASSESSMENT/Changes in Function:   Revised HEP to include functional RoM/hand based tasks using the RUE.  Trial use of R wrist extension/neutral splint to place R hand in optimal position for grasping/holding items    Patient will continue to benefit from skilled OT services to address Decreased range of motion, Decreased strength, Decreased coordination/prehension, Decreased ADL/functional abilities , Decreased activity tolerance, and Decreased flexibility/joint mobility to attain remaining goals. [x]  See Plan of Care  []  See progress note/recertification  []  See Discharge Summary         Progress towards goals / Updated goals:  Patient Goal (s): to be able to do things with my right hand\"     Short Term Goals: To be accomplished in 5-6 weeks:              1.  Patient will be mod I with her home exercise program to promote gains between sessions                          [] Met [] Not met [x] Partially met                2.  Patient will be able to use her R hand at mealtimes (up to 50 % ) for eating finger foods, holding napkin, etc.-discussed 3/17                          [] Met [] Not met [] Partially met                3.  Patient will be able to bathe at a Mod I level, using AE/adaptive techniques as needed --progressing, Assist with LUE, 3/17                          [] Met [] Not met [x] Partially met       Long Term Goals:  To be accomplished in 10-12 weeks:              1.  Patient will be mod I with grooming tasks                          [] Met [] Not met [] Partially met                2.  Patient will demonstrate a right hand  strength of > 12 pounds to decrease difficulty opening containers, holding items during IADLs-progressing 3/17                          [] Met [] Not met [] Partially met                3.  Patient will demonstrate a right lateral pinch strength of  4 + lbs to increase independence performing dressing tasks such as pulling up socks,pants                          [] Met [] Not met [] Partially met                4.  Patient will be compliant and independent with use/wear/care  of a right orthotic if obtained during course of therapy                          [] Met [] Not met [] Partially met      PLAN  [x]  Upgrade activities as tolerated     [x]  Continue plan of care  []  Update interventions per flow sheet       []  Discharge due to:_  []  Other:_      Afsaneh Coronado, OTR/L 3/28/2023

## 2023-03-30 ENCOUNTER — HOSPITAL ENCOUNTER (OUTPATIENT)
Dept: PHYSICAL THERAPY | Age: 58
End: 2023-03-30
Payer: MEDICAID

## 2023-03-30 PROCEDURE — 97110 THERAPEUTIC EXERCISES: CPT

## 2023-03-30 PROCEDURE — 97112 NEUROMUSCULAR REEDUCATION: CPT

## 2023-03-30 PROCEDURE — 97161 PT EVAL LOW COMPLEX 20 MIN: CPT

## 2023-03-30 NOTE — THERAPY EVALUATION
[address] [address] [facility] [address] [facility], [facility]  Phone: [phone]    Fax: [phone]    Initial Evaluation/Plan of Care/Statement of Necessity for Physical Therapy Services     Patient name: Desiree Samuel    Date/Start of Care:3/30/2023  : 1965  [x]  Patient  Verified  Provider#: [id number]          Referral source: Abi Garcia MD         Medical/Treatment Diagnosis: Muscle weakness (generalized) [M62.81]  Difficulty in walking, not elsewhere classified [R26.2]  Return visit to MD: non scheduled     Payor: [facility] / Plan: Ozell Sitter / Product Type: Managed Care Medicaid /       Prior Hospitalization: see medical history          Medications: Verified on Patient Summary List  Substance use: See intake       Patient / Family readiness to learn indicated by: asking questions, trying to perform skills, and interest  Persons(s) to be included in education: patient (P)  Barriers to Learning/Limitations: None  Patient Self Reported Health Status: fair  Rehabilitation Potential: fair - good  Previous Treatment/Compliance: yes acute rehab and outpatient last year  PMHx/Surgical Hx: See intake  Work Hx: not working at this time  Living Situation: Patient stated she lives with her daughter in a duplex apartment with 2 steps to enter with a rail. No more stairs inside. Motivation: good   Cognition: A & O x 4    SUBJECTIVE:  Patient was referred to PT due to R leg weakness, s/p CVA  2022 pt state she has a limp. Pt stated she was home and she was not able to raise her R arm and her mouth was twisting and throughout the night the leg was twisting too when she woke up at night to go to the bathroom. She called EMS and went to ER and was found to have a CVA back then. Pt imaging showed Acute left corona radiata infarction. Background of chronic white matter disease and remote infarctions as above.  She reports no pain just weakness, she went to [facility] arms and OP at [facility] and was D/C. She followed up with her doctor and he referred her to OT, she has been doing OT therapy for a few weeks now and she was referred back to PT to strengthen her R leg. She report no injuries or recent falls. Patient reports difficulty with walking due to her limp, her leg gets tired and she drags it. Pt reports functional limitations: with all her ADLs such as: pulling up her pants, walking, going up/down stairs, overall things just take longer. Onset Date: 8/2022     Goal:\" To walk better\"   Mechanism of Injury/History of Complaint: CVA   Area of pain: none    Pain Descriptors:  none   Pain Level (0-10 scale)  At rest: 0 With activity: 0   OBJECTIVE/FUNCTIONAL MEASURES including ROM/MMT:   Physical Findings   Ortho:   Posture:slight R side lean  Gross findings: decreased R arm swing   R handed     Specific joints: *normal values in ()    SHOULDER                                         AROM   PROM            MMT   R L R L R L   Flexion (180) 90 elbow flexed     3-    Extension (60)         Abduction (180) 60 elbow flexed shrug    2+    Adduction (0)         IR (70)         ER (90)         Horizontal Abduction (130)         Horizontal Adduction (45)         Additional comments: excessive shoulder shurg with arm elevation     ELBOW                             AROM                             PROM                             MMT   R L R L R L   Flexion (150) 110        Extension (0) 0        Additional comments:         SPINE:   CERVICAL       WNL     Physical Findings   Ortho:   Posture:  slight right side lean, narrow LOREN, feet slight ER.   Gait and Functional Mobility:  R hip hike with ambulation, decreased R foot heel to toe gait pattern and push off, decreased knee flexion during swing steppage gait pattern  Palpation: non noted   Gross findings: weak hamstring, tight calves,       Spine:   TRUNK ROM:     Flexion:                         [] N/A  [x]WNL []Minimal  []Moderate  [] Major   Extension:                     [] N/A  [x]WNL  []Minimal  []Moderate  [] Major      Specific joints: *normal values in ()      Hip      AROM       PROM             MMT   R L R L R L   Flexion (120)     4- 4   Extension (15)     2+ 4-   Abduction (40)     4- 4   Adduction (30)         IR (40)         ER (40)         Additional comments:   90-90 hamstring flexibilty test  (L) 40 deg  from full extension   (R) 50 deg from full extension      Knee          AROM          PROM                       MMT   R L R L R L   Extension (0)      4 4+   Flexion (145)     4- 4+   Additional comments:     Heel to shin coordination test  R side 8 sec- dysmetria, decreased coordination and smooth foot placement. L side 5 sec     Ankle                                 AROM                       PROM                             MMT   R L R L R L   Dorsiflexion (15) -10 5 `10  10 3- 4+   Plantarflexion (50) 50 50   3- 4+   Additional comments: B Inversion 40 deg / EV - R12 deg/ L 40 deg   R EV 2-/5 / L IN 3-/5    L EV/IN 4/5    Mobility Assessment: limited with ambulation.     Gait and Functional Mobility:  Transfers:   Bed mobility: independent   Sit to/from Supine: independent  Sit to/from stands: independent but does not use R UE to push from chair or reach back and decreased controlled descend noted with sit to stand functional test  Gait:  R hip hike with ambulation, decreased R foot heel to toe gait pattern and push off, decreased knee flexion during swing   Assistive device:  none    Stairs: negotiated 4(6'') steps with 1 rail and STS ascending/descending increased R LE weakness with activity noted       Patient reports functional limitations with:walking      Neurological: Reflexes / Sensations: slight decreased sensation to light touch on R LE  Special Tests:      Sitting Balance: (unsupported)  Static:      [x] Normal [] Good [] Fair [] Poor  Dynamic: [x] Normal [] Good [] Fair [] Poor  Standing Balance:   Static:     [x] Normal [] Good [] Fair [] Poor  Dynamic: [] Normal [x] Good [] Fair [] Poor    Modified Clinical Test of Sensory Interaction (CTSIB-M):    Eyes open/firm surface: 2 min    Eyes closed/firm surface: 2 min    Eyes open/on foam: 2 min    Eyes closed/on foam: 1min and 8  sec mod sway     Tandem Stand: (eyes open/eyes closed)   R foot forward: 11 sec EO / 1 sec EC LOB   L foot forward: 2 sec first trial, 30 sec 2nd trial (wobbly unsteady) EO / 1 sec EC LOB     Single limb stand:   R: 3sec  L: 29 sec    Functional Reach   Feet Placement: WBOS    Score: 10 inch  <7 inches indicates poor functional balance  COMMENTS (include gait belt, level of assistance):  sv       Five times sit to stand test result in seconds:  Able to do 3 reps without UE support in 19 sec increased forwards trunk for momentum, using mainly L LE to stand and decreased controlled descend noted. Normative averages:    Clients younger than 61years old  £  10 seconds = Normal   Clients older than 61years old £ 14.2 seconds = Normal   Change of ³ 2.3 seconds shows a significant clinical improvement    Franca BURDICK. Reference values for the five repetition sit to stand test: a descriptive metaanalysis of data from elders. Percept Mot Skills 2006; 103(1):215-222. Case Balance Test:    CASE FUNCTIONAL BALANCE SCALE  (Adapted from Marquis Charissa Quinn., Akash SKARO. and Karma Reynoso. Measuring balance in the elderly: validation of an instrument. Can. [facility], 93(L3): P1B73, 1992.)    1. Sitting to standing _4____  Instruction: Please stand up. Try not to use your hands for support. (4) able to stand, no hands and stabilize independently  (3) able to stand independently using hands  (2) able to stand using hands after several tries  (1) needs minimal assist to stand or to stabilize  (0) needs moderate to maximal assist to stand    2.  Standing unsupported _4____  Instruction: Stand for two minutes without holding. (4) able to stand safely 2 minutes  (3) able to stand 2 minutes with supervision  (2) able to stand 30 seconds unsupported  (1) needs several tries to stand 30 seconds unsupported  (0) unable to stand 30 seconds unassisted  IF SUBJECT ABLE TO STAND 2 MINUTES SAFELY, SCORE FULL MARKS FOR SITTING  UNSUPPORTED. PROCEED TO POSITION CHANGE STANDING TO SITTING    3. Sitting unsupported feet on floor __4___  Instruction: Sit with arms folded for two minutes. (4) able to sit safely and securely 2 minutes  (3) able to sit 2 minutes under supervision  (2) able to sit 30 seconds  (1) able to sit 10 seconds  (0) unable to sit without support 10 seconds    4. Standing to sitting _____4  Instruction: Please sit down. (4) sits safely with minimal use of hands  (3) controls descent by using hands  (2) uses back of legs against chair to control descent  (1) sits independently but has uncontrolled descent  (0) needs assistance to sit      5. Transfers ___4__  Instruction: Please move from chair or bed and back again. One way toward a seat with armrests and one  way toward a seat without arm rests. (4) able to transfer safely with only minor use of hands  (3) able to transfer safely with definite need of hands  (2) able to transfer with verbal cueing and/or supervision  (1) needs one person to assist  (0) needs two people to assist or supervise to be safe    6. Standing unsupported with eyes closed ____4_  Instruction: Close your eyes and stand still for 10 seconds. (4) able to stand 10 seconds safely  (3) able to stand 10 seconds with supervision  (2) able to stand 3 seconds  (1) unable to keep eyes closed 3 seconds but stays steady  (0) needs help to keep from falling    7. Standing unsupported with feet together __4___  Instruction: Place your feet together and stand without holding.   (4) able to place feet together independently and stand 1 minute safely  (3) able to place feet together independently and stand for 1 minute with supervision  (2) able to place feet together independently but unable to hold for 30 seconds  (1) needs help to attain position but able to stand 15 seconds feet together  (0) needs help to attain position and unable to hold for 15 seconds  THE FOLLOWING ITEMS ARE TO BE PERFORMED WHILE STANDING UNSUPPORTED. 8. Reaching forward with outstretched arms __4___  Instruction: Lift arm to 90 degrees. Stretch out your fingers and reach forward as far as you can. (Examiner  places a ruler at end of fingertips when arm is at 90 degrees. Fingers should not touch the ruler while  reaching forward. The recorded measure is the distance forward that the fingers reach while the subject is in  the most forward lean position.)  (4) can reach forward confidently >10 inches  (3) can reach forward >5 inches safely  (2) can reach forward >2 inches safely  (1) reaches forward but needs supervision  (0) needs help to keep from falling    9.  object from the floor ____3 unable to pick with R _  Instruction:  the shoe/slipper which is placed in front of your feet. (4) able to  slipper safely and easily  (3) able to  slipper but needs supervision  (2) unable to  but reaches 1-2 inches from slipper and keeps balance independently  (1) unable to  and needs supervision while trying  (0) unable to try/needs assist to keep from falling      10. Turning to look behind/over left and right shoulders 3_____  Instruction: Turn to look behind you over/toward your left shoulder. Repeat to the right. (4) looks behind from both sides and weight shifts well  (3) looks behind one side only; other side shows less weight shift  (2) turns sideways only but maintains balance  (1) needs supervision when turning  (0) needs assist to keep from falling    11. Turn 360 degrees __3 _(R side = 5 sec /L side 3 sec) _  Instruction: Turn completely around in a full [place]. Pause.  Then turn a full [place] in the other direction. (4) able to turn 360 safely in <4 seconds each side  (3) able to turn 360 safely one side only in <4 seconds  (2) able to turn 360 safely but slowly  (1) needs close supervision or verbal cueing  (0) needs assistance while turning  [id number] [address] UNSUPPORTED. 12. Count number of times step touch measured stool __1___(minor LOB)   Instruction: Place each foot alternately on the stool. Continue until each foot has touched the stool four times. (4) able to stand independently and safely and complete 8 steps in 20 seconds  (3) able to stand independently and complete 8 steps in >20 seconds  (2) able to complete 4 steps without aid with supervision  (1) able to complete >2 steps needs minimal assist  (0) needs assistance to keep from falling/unable to try    13. Standing unsupported, one foot in front ____3_  Instruction: (Demonstrate to subject) Place one foot directly in front of the other. If you feel that you cannot  place your foot directly in front, try to step far enough ahead that the heel of your forward foot is ahead of the  toes of the other foot. (4) able to place foot tandem independently and hold 30 seconds  (3) able to place foot ahead of other independently and hold 30 seconds  (2) able to take small step independently and hold 30 seconds  (1) needs help to step but can hold 15 seconds  (0) loses balance while stepping or standing    14. Standing on one leg ___2__  Instruction: Stand on one leg as long as you can without holding.   (4) able to lift leg independently and hold >10 seconds  (3) able to lift leg independently and hold 5-10 seconds  (2) able to lift leg independently and hold = or >3 seconds  (1) tries to lift leg; unable to hold 3 seconds but remains standing independently  (0) unable to try or needs assist to prevent fall  TOTAL SCORE: ___47__/56         56=Maximum possible score;   0-20=High fall risk  21-40=Moderate fall risk   41-56=Low fall risk       6MWT - Pt ambulated 425ft in 3 min and 30 sec, she presents with R hip hike, decreased heel strike and push off, decreased R knee flexion during swing and slight steppage gait. Comorbidities: see chart  Prior Level of Function: Independent with a cane long distance and sometimes indoors too pending on the fatigue. Patient/ Caregiver education and instruction: brace/ splint application and exercises  Problem List/Impairments: decrease ROM, decrease strength, impaired gait/ balance, decrease ADL/ functional abilitiies, decrease activity tolerance, decrease flexibility/ joint mobility, and decrease transfer abilities  Barriers to Progress:  R side weakness from CVA    TODAY'S TREATMENT:  EVALUATION completed      Evaluation Complexity:      History MEDIUM  Complexity : 1-2 comorbidities / personal factors will impact the outcome/ POC   Presentation LOW Complexity : Stable, uncomplicated   Examination MEDIUM Complexity : 3 Standardized tests and measures addressing body structure, function, activity limitation and / or participation in recreation   Clinical Decision Making MEDIUM Complexity : FOTO score of 26-74  Overall Complexity Rating: LOW   (The severity rating is based on clinical judgment and standardized tests.)    Visit #: 1  In [facility]:[id number]   Out time: 1050  Total Treatment Time (min): 55   Total Timed Codes (min): 15 1:1 Treatment Time ([facility] only):  15   Pain Level (0-10 scale) pre treatment: 0/10    Pain Level (0-10 scale) post treatment: 0/10    OBJECTIVE:  15  min Neuromuscular Re-education:  []  See flow sheet :CASE    Rationale: increase ROM, increase strength, improve coordination, improve balance, and increase proprioception  to improve the patients ability to reduce fall risk during ambulation/ADL's.       With   [] TE   [] TA   [x] Neuro   [] SC   [] other: Patient Education:  [] HEP reviewed          [] Fall Prevention/Gait Training         [] Posture Correction  [] Progressed/Changed HEP based on:        [] positioning/ body mechanics  [] repetitions/resistance  [] transfers  [] pain   [] Safe use of heat/ice  [] Scar/Soft Tissue mobilizations  [x] Other: CVA recovery /potential use of brace         OTHER OBJECTIVE/FUNCTIONAL MEASURES:       ASSESSMENT/Changes in Function:     Patient is a 58 y/o female s/p CVA 8/2022, she presents with decreased R LE strength, decreased balance, coordination, decreased flexibility and gait impairments. Patient will benefit from skilled PT services to modify and progress therapeutic interventions, address functional mobility deficits, address ROM deficits, address strength deficits, analyze and address soft tissue restrictions, analyze and cue movement patterns, analyze and modify body mechanics/ergonomics, and assess and modify postural abnormalities to attain all goals. GOALS/Progress Towards Goals:    Ampac Score:  Initial: 45.24%    Patient Goal (s): To walk better     [] Met [] Not met [] Partially met  Date:    Short Term Goals: To be accomplished in 6-8 treatments. Pt will be independent with HEP to allow for progression of therapy services and decrease/prevent soreness after sessions. [] Met [] Not met [] Partially met   2. Pt will be able to verbalize techniques that promote awareness of positioning and surroundings during transfers/gait to prevent falls. [] Met [] Not met [] Partially met         Long Term Goals: To be accomplished in 16-24  treatments. Pt will have improved AMPAC score by 5%. [] Met [] Not met [] Partially met Date:   2. Patient can ambulate community distances with LRAD , foot brace ? and without LOB or fatigue to get groceries, meds, etc.         [] Met [] Not met [] Partially met Date:   3. Patient can get up after sitting for >/= 30 minutes without difficulty or stumbling for safe transfers. [] Met [] Not met [] Partially met Date:   4.  Patient CASE  will improve by 3-5 points to show improved in balance and reduce fall risk. [] Met [] Not met [] Partially met Date:   5. Patient can SLS  RLE with no support for 10- 15 seconds to improve coordination and decrease fall risk. [] Met [] Not met [] Partially met Date:   6. Patient reports no stumbling or foot catching with walking community distances to prevent falls. [] Met [] Not met [] Partially met Date:   7. Patient can negotiate uneven terrain with LRAD and no fear of falling or LOB for safe gait. [] Met [] Not met [] Partially met  Date:    8. Patient can go up and down steps with 1 rail support without difficulty or fatigue, or catching feet on the steps. [] Met [] Not met [] Partially met Date:   9. Patient can perform a 6 min walk test of at least >850  feet without having to rest, stumbling, or abnormal gait pattern. [] Met [] Not met [] Partially met  Date:      8. Pt will score <= 22/24 on DGI to show decreased fall risk with dynamic balance activities . [] Met [] Not met [] Partially met  Date:      PLAN:  Frequency / Duration: Patient to be seen 2 times per week for 16-24 treatments. Certification Period: (Initial) 3/30/23-6/30/23  Treatment Plan may include any combination of the following: Therapeutic exercise, Neuromuscular reeducation, Manual therapy, Therapeutic activity, Electric stim unattended , and Gait training    []  Continue plan of care  []  Upgrade activities as tolerated       []  Update interventions per flow sheet     []  Other:      []  Discharge due to:      [x]  Plan of care has been reviewed with PTA. The Plan of Care is based on information from the initial evaluation. Alicia Gomez, PT, DPT 3/30/2023   ________________________________________________________________________    I certify that the above Therapy Services are being furnished while the patient is under my care. I agree with the treatment plan and certify that this therapy is necessary.     [facility] Signature:_________________________________________________  Date:____________Time: ____________     Annalise Karin, MD

## 2023-03-30 NOTE — PROGRESS NOTES
OT DAILY TREATMENT NOTE  3-16    Patient Name: Krystina August  Date:3/30/2023  : 1965  [x]  Patient  Verified  Payor: Marlin Catrinadanielle / Plan: [employer] / Product Type: Managed [employer] Medicaid /    In time: 900  Out time: 950  Total Treatment Time (min): 50  Visit #: 9     Treatment Area: Muscle weakness (generalized) [M62.81]    SUBJECTIVE  Pain Level in    (0-10 scale): 0  Pain Level out  (0-10 scale): 0      Next MD appointment:n/a    Any medication changes, allergies to medications, adverse drug reactions, diagnosis change, or new procedure performed?: [x] No    [] Yes (see summary sheet for update)  Subjective functional status/changes:   [x] No changes reported  --patient has PT evaluation today    OBJECTIVE    Modality rationale: increase muscle contraction/control to improve the patients ability to use the right hand (grasp/release) for ADL tasks    Min Type Additional Details      (15)  [x]Estim: []Att   []Unatt    []TENS instruct                  []IFC  []Premod   [x]NMES                    See TE/NM below for charges []w/US   []w/ice   []w/heat  Position: sit/stand  Location: R wrist/finger extensors       []  Traction: [] Cervical       []Lumbar                       [] Prone          []Supine                       []Intermittent   []Continuous Lbs:  [] before manual  [] after manual  []w/heat    []  Ultrasound: []Continuous   [] Pulsed                       at: []1MHz   []3MHz Location:  W/cm2:    [] Paraffin         Location:   []w/heat    []  Ice     []  Heat  []  Ice massage Position:  Location:    []  Laser  []  Other: Position:  Location:      []  Vasopneumatic Device Pressure:       [] lo [] med [] hi   Temperature:      [x] Skin assessment post-treatment:  [x]intact []redness- no adverse reaction    []redness - adverse reaction:      35 min Therapeutic Exercise:  [x] See flow sheet :   Rationale: increase ROM and increase strength to improve the patients ability to use the right hand functionally for ADL/IADL tasks    15 min Neuromuscular Re-education:  [x]  See flow sheet :   Rationale: increase ROM and improve coordination  to improve the patients ability to use the RUE as a functional assist during ADL/IADL tasks    With   [] TE   [] TA   [x] neuro   [] other:   []  SC Patient Education: []  HEP    [] Progressed/Changed HEP based on:   [] positioning   [x] body mechanics   [] transfers   [] heat/ice application [] edema management  [] Splint wear/care   [] Sensory re-education   [] scar management      [] other:              Patient tolerance to treatment:  [] poor  [] fair  [x] good  []  excellent   ASSESSMENT/Changes in Function:   Patient demonstrates right wrist active  RD/UD at 2-/5. Responding well to e-stim for wrist/finger extension. Min verbal and/or manual cues provided to < R shoulder hiking/trunk rotation when performing reaching tasks (forward/back). Patient will continue to benefit from skilled OT services to address Decreased range of motion, Decreased strength, Decreased coordination/prehension, Decreased ADL/functional abilities , Decreased activity tolerance, and Decreased flexibility/joint mobility to attain remaining goals. [x]  See Plan of Care  []  See progress note/recertification  []  See Discharge Summary         Progress towards goals / Updated goals:  Patient Goal (s): to be able to do things with my right hand\"     Short Term Goals:  To be accomplished in 5-6 weeks:              1.  Patient will be mod I with her home exercise program to promote gains between sessions                          [] Met [] Not met [x] Partially met                2.  Patient will be able to use her R hand at mealtimes (up to 50 % ) for eating finger foods, holding napkin, etc.-discussed 3/17                          [] Met [] Not met [] Partially met                3.  Patient will be able to bathe at a Mod I level, using AE/adaptive techniques as needed --progressing, Assist with LUE, 3/17                          [] Met [] Not met [x] Partially met       Long Term Goals:  To be accomplished in 10-12 weeks:              1.  Patient will be mod I with grooming tasks                          [] Met [] Not met [] Partially met                2.  Patient will demonstrate a right hand  strength of > 12 pounds to decrease difficulty opening containers, holding items during IADLs-progressing 3/17                          [] Met [] Not met [] Partially met                3.  Patient will demonstrate a right lateral pinch strength of  4 + lbs to increase independence performing dressing tasks such as pulling up socks,pants                          [] Met [] Not met [] Partially met                4.  Patient will be compliant and independent with use/wear/care  of a right orthotic if obtained during course of therapy                          [] Met [] Not met [] Partially met      PLAN  [x]  Upgrade activities as tolerated     [x]  Continue plan of care  []  Update interventions per flow sheet       []  Discharge due to:_  []  Other:_      [id number] [facility]

## 2023-04-12 ENCOUNTER — HOSPITAL ENCOUNTER (OUTPATIENT)
Dept: PHYSICAL THERAPY | Age: 58
Discharge: HOME | End: 2023-04-12
Payer: MEDICAID

## 2023-04-12 PROCEDURE — 97110 THERAPEUTIC EXERCISES: CPT

## 2023-04-12 PROCEDURE — 97112 NEUROMUSCULAR REEDUCATION: CPT

## 2023-04-14 ENCOUNTER — HOSPITAL ENCOUNTER (OUTPATIENT)
Dept: PHYSICAL THERAPY | Age: 58
Discharge: HOME | End: 2023-04-14
Payer: MEDICAID

## 2023-04-14 PROCEDURE — 97110 THERAPEUTIC EXERCISES: CPT

## 2023-04-19 ENCOUNTER — HOSPITAL ENCOUNTER (OUTPATIENT)
Dept: PHYSICAL THERAPY | Age: 58
Discharge: HOME | End: 2023-04-19
Payer: MEDICAID

## 2023-04-19 PROCEDURE — 97110 THERAPEUTIC EXERCISES: CPT

## 2023-04-21 ENCOUNTER — HOSPITAL ENCOUNTER (OUTPATIENT)
Dept: PHYSICAL THERAPY | Age: 58
Discharge: HOME | End: 2023-04-21
Payer: MEDICAID

## 2023-04-21 PROCEDURE — 97110 THERAPEUTIC EXERCISES: CPT

## 2023-04-21 PROCEDURE — 97112 NEUROMUSCULAR REEDUCATION: CPT

## 2023-04-26 ENCOUNTER — HOSPITAL ENCOUNTER (OUTPATIENT)
Dept: PHYSICAL THERAPY | Age: 58
Discharge: HOME | End: 2023-04-26
Payer: MEDICAID

## 2023-04-26 PROCEDURE — 97110 THERAPEUTIC EXERCISES: CPT

## 2023-04-28 ENCOUNTER — APPOINTMENT (OUTPATIENT)
Dept: PHYSICAL THERAPY | Age: 58
End: 2023-04-28
Payer: MEDICAID

## 2023-05-01 ENCOUNTER — APPOINTMENT (OUTPATIENT)
Facility: HOSPITAL | Age: 58
End: 2023-05-01
Payer: MEDICAID

## 2023-05-03 ENCOUNTER — HOSPITAL ENCOUNTER (OUTPATIENT)
Dept: PHYSICAL THERAPY | Age: 58
Discharge: HOME | End: 2023-05-03
Payer: MEDICAID

## 2023-05-03 PROCEDURE — 9990 CHARGE CONVERSION

## 2023-05-03 PROCEDURE — 97110 THERAPEUTIC EXERCISES: CPT

## 2023-05-05 ENCOUNTER — HOSPITAL ENCOUNTER (OUTPATIENT)
Dept: PHYSICAL THERAPY | Age: 58
Discharge: HOME | End: 2023-05-05
Payer: MEDICAID

## 2023-05-05 PROCEDURE — 9990 CHARGE CONVERSION

## 2023-05-05 PROCEDURE — 97110 THERAPEUTIC EXERCISES: CPT

## 2023-05-10 ENCOUNTER — APPOINTMENT (OUTPATIENT)
Facility: HOSPITAL | Age: 58
End: 2023-05-10
Payer: MEDICAID

## 2023-05-10 ENCOUNTER — APPOINTMENT (OUTPATIENT)
Dept: PHYSICAL THERAPY | Age: 58
End: 2023-05-10
Payer: MEDICAID

## 2023-05-12 ENCOUNTER — APPOINTMENT (OUTPATIENT)
Dept: PHYSICAL THERAPY | Age: 58
End: 2023-05-12
Payer: MEDICAID

## 2023-05-12 ENCOUNTER — APPOINTMENT (OUTPATIENT)
Facility: HOSPITAL | Age: 58
End: 2023-05-12
Payer: MEDICAID

## 2023-05-17 ENCOUNTER — HOSPITAL ENCOUNTER (OUTPATIENT)
Facility: HOSPITAL | Age: 58
Setting detail: RECURRING SERIES
Discharge: HOME | End: 2023-05-20
Payer: MEDICAID

## 2023-05-17 PROCEDURE — 97110 THERAPEUTIC EXERCISES: CPT

## 2023-05-17 NOTE — THERAPY RECERTIFICATION
[phone]. Thank you for allowing us to assist in the care of your patient. Certification Period: 5/13/23 - 8/15/23      Gavni Drake OT       5/17/2023       12:03 PM      ___ I have read the above report and request that my patient continue as recommended.   ___ I have read the above report and request that my patient continue therapy with the following changes/special instructions: ________________________________________________   ___ I have read the above report and request that my patient be discharged from therapy. [de-identified] Signature:_________________________   DATE:_________   TIME:________                           Jeb Henry MD    ** Signature, Date and Time must be completed for valid certification **  Please sign and fax to [phone].   Thank you

## 2023-05-17 NOTE — PROGRESS NOTES
OCCUPATIONAL THERAPY - DAILY TREATMENT NOTE (updated 3/23)      Date: 2023          Patient Name:  Angel Prieto :  1965   Medical   Diagnosis:  Muscle weakness (generalized) [M62.81] Treatment Diagnosis:  M62.81  GENERAL MUSCLE WEAKNESS    Referral Source:  Haily Lyon MD Insurance:   Payor: [employer] / Plan: [employer] [address] / Product Type: *No Product type* /                     Patient  verified yes     Visit #   Current  / Total 16 24   Time   In / Out 920 1000   Total Treatment Time 40   Total Timed Codes 40         SUBJECTIVE    Pain Level (0-10 scale): 0    Any medication changes, allergies to medications, adverse drug reactions, diagnosis change, or new procedure performed?: [x] No    [] Yes (see summary sheet for update)  Medications: Verified on Patient Summary List    Subjective functional status/changes:     Patient measured for a right AFO;  --delivery date  --patient states her R knee feels stiff    OBJECTIVE      Therapeutic Procedures: Tx Min Billable or 1:1 Min (if diff from Tx Min) Procedure, Rationale, Specifics   40 40 [id number] Therapeutic Exercise (timed):  increase ROM, strength, coordination, balance, and proprioception to improve patient's ability to progress to PLOF and address remaining functional goals.  (see flow sheet as applicable)    Details if applicable:           Details if applicable:  40         Details if applicable:           Details if applicable:           Details if applicable:     40     Total Total         [x]  Patient Education billed concurrently with other procedures   [x] Review HEP    [] Progressed/Changed HEP, detail:    [x] Other detail:   results of re-assessemt      Other Objective/Functional Measures      Measurements: Taken with [employer]alo Dynamometer, in lbs   Level 2 DATE  2/15 DATE  3/17 DATE   DATE   DATE   Right 5  7  9  10     Left 45 N/t   n/t  45        Pinch Measurements: Taken with Pinch Gauge, in lbs     Date  2/15

## 2023-05-19 ENCOUNTER — HOSPITAL ENCOUNTER (OUTPATIENT)
Facility: HOSPITAL | Age: 58
Setting detail: RECURRING SERIES
Discharge: HOME | End: 2023-05-22
Payer: MEDICAID

## 2023-05-19 PROCEDURE — 97110 THERAPEUTIC EXERCISES: CPT

## 2023-05-19 PROCEDURE — 97112 NEUROMUSCULAR REEDUCATION: CPT

## 2023-05-26 ENCOUNTER — HOSPITAL ENCOUNTER (OUTPATIENT)
Facility: HOSPITAL | Age: 58
Setting detail: RECURRING SERIES
Discharge: HOME | End: 2023-05-29
Payer: MEDICAID

## 2023-05-26 PROCEDURE — 97110 THERAPEUTIC EXERCISES: CPT | Performed by: PHYSICAL THERAPIST

## 2023-05-26 PROCEDURE — 97112 NEUROMUSCULAR REEDUCATION: CPT | Performed by: PHYSICAL THERAPIST

## 2025-03-21 ENCOUNTER — TRANSCRIBE ORDERS (OUTPATIENT)
Facility: HOSPITAL | Age: 60
End: 2025-03-21

## 2025-03-21 DIAGNOSIS — Z12.31 OTHER SCREENING MAMMOGRAM: Primary | ICD-10-CM
